# Patient Record
Sex: FEMALE | Race: WHITE | Employment: UNEMPLOYED | URBAN - METROPOLITAN AREA
[De-identification: names, ages, dates, MRNs, and addresses within clinical notes are randomized per-mention and may not be internally consistent; named-entity substitution may affect disease eponyms.]

---

## 2017-01-06 ENCOUNTER — HOSPITAL ENCOUNTER (INPATIENT)
Facility: HOSPITAL | Age: 67
LOS: 3 days | Discharge: HOME | DRG: 392 | End: 2017-01-09
Attending: EMERGENCY MEDICINE | Admitting: HOSPITALIST
Payer: COMMERCIAL

## 2017-01-06 ENCOUNTER — APPOINTMENT (OUTPATIENT)
Dept: CT IMAGING | Facility: HOSPITAL | Age: 67
DRG: 392 | End: 2017-01-06
Attending: EMERGENCY MEDICINE
Payer: COMMERCIAL

## 2017-01-06 DIAGNOSIS — K57.92 ACUTE DIVERTICULITIS: Primary | ICD-10-CM

## 2017-01-06 LAB
ALBUMIN SERPL-MCNC: 3.7 G/DL (ref 3.5–4.8)
ALP LIVER SERPL-CCNC: 65 U/L (ref 50–130)
ALT SERPL-CCNC: 13 U/L (ref 14–54)
AST SERPL-CCNC: 14 U/L (ref 15–41)
BASOPHILS # BLD AUTO: 0.04 X10(3) UL (ref 0–0.1)
BASOPHILS NFR BLD AUTO: 0.3 %
BILIRUB SERPL-MCNC: 1.1 MG/DL (ref 0.1–2)
BILIRUB UR QL STRIP.AUTO: NEGATIVE
BUN BLD-MCNC: 11 MG/DL (ref 8–20)
CALCIUM BLD-MCNC: 8.7 MG/DL (ref 8.3–10.3)
CHLORIDE: 104 MMOL/L (ref 101–111)
CLARITY UR REFRACT.AUTO: CLEAR
CO2: 24 MMOL/L (ref 22–32)
COLOR UR AUTO: YELLOW
CREAT BLD-MCNC: 0.78 MG/DL (ref 0.55–1.02)
EOSINOPHIL # BLD AUTO: 0.06 X10(3) UL (ref 0–0.3)
EOSINOPHIL NFR BLD AUTO: 0.5 %
ERYTHROCYTE [DISTWIDTH] IN BLOOD BY AUTOMATED COUNT: 12.3 % (ref 11.5–16)
GLUCOSE BLD-MCNC: 91 MG/DL (ref 70–99)
GLUCOSE UR STRIP.AUTO-MCNC: NEGATIVE MG/DL
HCT VFR BLD AUTO: 40.1 % (ref 34–50)
HGB BLD-MCNC: 13.6 G/DL (ref 12–16)
IMMATURE GRANULOCYTE COUNT: 0.03 X10(3) UL (ref 0–1)
IMMATURE GRANULOCYTE RATIO %: 0.2 %
KETONES UR STRIP.AUTO-MCNC: NEGATIVE MG/DL
LIPASE: 98 U/L (ref 73–393)
LYMPHOCYTES # BLD AUTO: 1.63 X10(3) UL (ref 0.9–4)
LYMPHOCYTES NFR BLD AUTO: 12.8 %
M PROTEIN MFR SERPL ELPH: 7.7 G/DL (ref 6.1–8.3)
MCH RBC QN AUTO: 33.4 PG (ref 27–33.2)
MCHC RBC AUTO-ENTMCNC: 33.9 G/DL (ref 31–37)
MCV RBC AUTO: 98.5 FL (ref 81–100)
MONOCYTES # BLD AUTO: 0.76 X10(3) UL (ref 0.1–0.6)
MONOCYTES NFR BLD AUTO: 6 %
NEUTROPHIL ABS PRELIM: 10.25 X10 (3) UL (ref 1.3–6.7)
NEUTROPHILS # BLD AUTO: 10.25 X10(3) UL (ref 1.3–6.7)
NEUTROPHILS NFR BLD AUTO: 80.2 %
NITRITE UR QL STRIP.AUTO: NEGATIVE
PH UR STRIP.AUTO: 6 [PH] (ref 4.5–8)
PLATELET # BLD AUTO: 291 10(3)UL (ref 150–450)
POTASSIUM SERPL-SCNC: 3.9 MMOL/L (ref 3.6–5.1)
RBC # BLD AUTO: 4.07 X10(6)UL (ref 3.8–5.1)
RBC UR QL AUTO: NEGATIVE
RED CELL DISTRIBUTION WIDTH-SD: 44.6 FL (ref 35.1–46.3)
SODIUM SERPL-SCNC: 137 MMOL/L (ref 136–144)
SP GR UR STRIP.AUTO: 1.02 (ref 1–1.03)
UROBILINOGEN UR STRIP.AUTO-MCNC: 0.2 MG/DL
WBC # BLD AUTO: 12.8 X10(3) UL (ref 4–13)

## 2017-01-06 PROCEDURE — 74177 CT ABD & PELVIS W/CONTRAST: CPT

## 2017-01-06 PROCEDURE — 99223 1ST HOSP IP/OBS HIGH 75: CPT | Performed by: HOSPITALIST

## 2017-01-06 RX ORDER — METRONIDAZOLE 500 MG/100ML
500 INJECTION, SOLUTION INTRAVENOUS EVERY 8 HOURS
Status: DISCONTINUED | OUTPATIENT
Start: 2017-01-06 | End: 2017-01-09

## 2017-01-06 RX ORDER — SODIUM CHLORIDE 9 MG/ML
INJECTION, SOLUTION INTRAVENOUS CONTINUOUS
Status: ACTIVE | OUTPATIENT
Start: 2017-01-06 | End: 2017-01-06

## 2017-01-06 RX ORDER — LORAZEPAM 2 MG/ML
0.5 INJECTION INTRAMUSCULAR NIGHTLY PRN
Status: DISCONTINUED | OUTPATIENT
Start: 2017-01-06 | End: 2017-01-09

## 2017-01-06 RX ORDER — ONDANSETRON 2 MG/ML
4 INJECTION INTRAMUSCULAR; INTRAVENOUS EVERY 4 HOURS PRN
Status: DISCONTINUED | OUTPATIENT
Start: 2017-01-06 | End: 2017-01-06

## 2017-01-06 RX ORDER — HYDROMORPHONE HYDROCHLORIDE 1 MG/ML
0.8 INJECTION, SOLUTION INTRAMUSCULAR; INTRAVENOUS; SUBCUTANEOUS EVERY 2 HOUR PRN
Status: DISCONTINUED | OUTPATIENT
Start: 2017-01-06 | End: 2017-01-09

## 2017-01-06 RX ORDER — LEVOFLOXACIN 5 MG/ML
750 INJECTION, SOLUTION INTRAVENOUS EVERY 24 HOURS
Status: DISCONTINUED | OUTPATIENT
Start: 2017-01-06 | End: 2017-01-09

## 2017-01-06 RX ORDER — ESTRADIOL 10 UG/1
10 INSERT VAGINAL
COMMUNITY
End: 2018-05-21

## 2017-01-06 RX ORDER — SODIUM CHLORIDE 9 MG/ML
1000 INJECTION, SOLUTION INTRAVENOUS ONCE
Status: COMPLETED | OUTPATIENT
Start: 2017-01-06 | End: 2017-01-06

## 2017-01-06 RX ORDER — HYDROMORPHONE HYDROCHLORIDE 1 MG/ML
0.2 INJECTION, SOLUTION INTRAMUSCULAR; INTRAVENOUS; SUBCUTANEOUS EVERY 2 HOUR PRN
Status: DISCONTINUED | OUTPATIENT
Start: 2017-01-06 | End: 2017-01-09

## 2017-01-06 RX ORDER — NORETHINDRONE ACETATE AND ETHINYL ESTRADIOL 1; 5 MG/1; UG/1
1 TABLET ORAL DAILY
COMMUNITY
End: 2017-12-03

## 2017-01-06 RX ORDER — HYDROMORPHONE HYDROCHLORIDE 1 MG/ML
0.4 INJECTION, SOLUTION INTRAMUSCULAR; INTRAVENOUS; SUBCUTANEOUS EVERY 2 HOUR PRN
Status: DISCONTINUED | OUTPATIENT
Start: 2017-01-06 | End: 2017-01-09

## 2017-01-06 RX ORDER — HEPARIN SODIUM 5000 [USP'U]/ML
5000 INJECTION, SOLUTION INTRAVENOUS; SUBCUTANEOUS EVERY 8 HOURS
Status: DISCONTINUED | OUTPATIENT
Start: 2017-01-06 | End: 2017-01-09

## 2017-01-06 RX ORDER — HYDROMORPHONE HYDROCHLORIDE 1 MG/ML
1 INJECTION, SOLUTION INTRAMUSCULAR; INTRAVENOUS; SUBCUTANEOUS ONCE
Status: COMPLETED | OUTPATIENT
Start: 2017-01-06 | End: 2017-01-06

## 2017-01-06 RX ORDER — ONDANSETRON 2 MG/ML
4 INJECTION INTRAMUSCULAR; INTRAVENOUS ONCE
Status: COMPLETED | OUTPATIENT
Start: 2017-01-06 | End: 2017-01-06

## 2017-01-06 RX ORDER — IBUPROFEN 200 MG
200 TABLET ORAL EVERY 6 HOURS PRN
COMMUNITY
End: 2019-02-16 | Stop reason: ALTCHOICE

## 2017-01-06 RX ORDER — METRONIDAZOLE 500 MG/100ML
500 INJECTION, SOLUTION INTRAVENOUS ONCE
Status: COMPLETED | OUTPATIENT
Start: 2017-01-06 | End: 2017-01-06

## 2017-01-06 RX ORDER — METOCLOPRAMIDE HYDROCHLORIDE 5 MG/ML
10 INJECTION INTRAMUSCULAR; INTRAVENOUS EVERY 6 HOURS PRN
Status: DISCONTINUED | OUTPATIENT
Start: 2017-01-06 | End: 2017-01-09

## 2017-01-06 RX ORDER — SODIUM CHLORIDE 9 MG/ML
INJECTION, SOLUTION INTRAVENOUS CONTINUOUS
Status: DISCONTINUED | OUTPATIENT
Start: 2017-01-06 | End: 2017-01-09

## 2017-01-06 RX ORDER — LEVOFLOXACIN 5 MG/ML
750 INJECTION, SOLUTION INTRAVENOUS ONCE
Status: DISCONTINUED | OUTPATIENT
Start: 2017-01-06 | End: 2017-01-06

## 2017-01-06 RX ORDER — ONDANSETRON 2 MG/ML
4 INJECTION INTRAMUSCULAR; INTRAVENOUS EVERY 6 HOURS PRN
Status: DISCONTINUED | OUTPATIENT
Start: 2017-01-06 | End: 2017-01-09

## 2017-01-06 RX ORDER — BIOTIN 1 MG
1 TABLET ORAL DAILY
COMMUNITY
End: 2017-12-04

## 2017-01-06 RX ORDER — HYDROMORPHONE HYDROCHLORIDE 1 MG/ML
0.5 INJECTION, SOLUTION INTRAMUSCULAR; INTRAVENOUS; SUBCUTANEOUS EVERY 30 MIN PRN
Status: DISPENSED | OUTPATIENT
Start: 2017-01-06 | End: 2017-01-06

## 2017-01-06 RX ORDER — LORAZEPAM 2 MG/ML
0.5 INJECTION INTRAMUSCULAR EVERY 6 HOURS PRN
Status: DISCONTINUED | OUTPATIENT
Start: 2017-01-06 | End: 2017-01-06

## 2017-01-06 NOTE — CONSULTS
[facility]  Report of Consultation    Geri Hunter Patient Status:  Emergency    1950 MRN [id number]   Location [facility] Attending Jeremiah Doe MD   Hosp Day # 0 PCP Maverick Keyes     Reason for Consultation:  Johnny Jose significant history of colon cancer in her mother ( at age 80) and maternal grandmother (age unknown). Both her father and daughter have had diverticulitis. The patient does not take any blood thinners except for occasional NSAIDs for pain.   Sh Aj    MRI, LUMBAR SPINE  2015     Family History   Problem Relation Age of Onset   • Lung Disorder Father         • Heart Disease Father    • Other[Other] [OTHER] Mother      emph   • High Blood Pressure Mother         • High Ch Negative for gait disturbance  Psychiatric:  Negative for inappropriate interaction and psychiatric symptoms  Respiratory:  Negative for cough, dyspnea and wheezing    Physical Exam:  Blood pressure 136/75, pulse 92, temperature 98 °F (36.7 °C), temperatur    transmitted to the [facility] ([facility]) [address]) which includes the Dose Index Registry.      PATIENT STATED HISTORY:  Patient states pressure in pelvic and anal area, worse with standing /walking last 3 day visible mass.  Pelvic organs appropriate for patient age.     BONES:  Mild degenerative changes at L2-3. .  No bony lesion or fracture.     LUNG BASES:  Normal.  No visible pulmonary or pleural disease.         =====  CONCLUSION:         1.  Significant mild Oswald  1/6/2017  4:24 PM    Addendum:  Dr. Nicole Urrutia      I have reviewed the above listed note and evaluation by the physician assistant. I have personally examined the patient and reviewed all relevant labs and reports.  I agree with her physical exam asked her to follow-up with Dr. Kaden Stuart. He may elect to perform an endoscopic EGD with ultrasound. I am concerned about the head of the pancreas in this significant dilatation of the biliary tree and gallbladder. She may benefit from a papillotomy.

## 2017-01-06 NOTE — ED PROVIDER NOTES
Patient Seen in: [facility] Emergency Department    History   Patient presents with:  Abdomen/Flank Pain (GI/)    Stated Complaint: lower abdominal pain    HPI    24-year-old female complaining of abdominal pain the patient's and lower abdominal p SPI DX/THER NJX N/A 10/26/2015    Comment Procedure: LUMBAR / TRANSFORAMINAL EPIDURAL STEROID INJECTION;  Surgeon: Britta Dang DO;  Location: [address] [place] [address]    MRI, LUMBAR SPINE  06/2015       Medications :   Cholecalciferol (VITAMIN D3) 10 95   Resp 01/06/17 1145 17   Temp 01/06/17 1145 98 °F (36.7 °C)   Temp src 01/06/17 1145 Temporal   SpO2 01/06/17 1145 99 %   O2 Device 01/06/17 1200 None (Room air)       Current:/75 mmHg  Pulse 92  Temp(Src) 98 °F (36.7 °C) (Temporal)  Resp 18  Ht Abnormal            Final result                 Please view results for these tests on the individual orders.    RAINBOW DRAW BLUE   RAINBOW DRAW GOLD   RAINBOW DRAW LAVENDER   RAINBOW DRAW LIGHT GREEN   URINE CULTURE, ROUTINE       MDM   CT scan shows d

## 2017-01-06 NOTE — H&P
[facility]ITAL[facility]  History and Physical     Rubi Espitia Patient Status:  Emergency    1950 MRN [id number]   Location 6[facility] Attending Nora Trujillo MD   Hosp Day # 0 PCP Paula Kathleen     Chief Complaint: Shelby Mirela DX/THERAPEUTIC SUBSTANCE, EPIDURAL/SUBARACHNOID; LUMBAR/SACRAL N/A 10/26/2015    Comment Procedure: LUMBAR / TRANSFORAMINAL EPIDURAL STEROID INJECTION;  Surgeon: Debbie Russell DO;  Location: [address] [place] [address] ND/CATH [facility] D oriented x 3. HEENT: Normocephalic atraumatic. Moist mucous membranes. Respiratory: Clear to auscultation bilaterally. Cardiovascular: S1, S2. Regular rate and rhythm. Equal pulses. Abdomen: Soft, tender RLQ and LLQ, nondistended.   Positive bowel s

## 2017-01-06 NOTE — PROGRESS NOTES
[facility] Pharmacy Note:  Renal Adjustment for Levaquin (levofloxacin)    Cristopher Ramon is a 61year old female who has been prescribed Levaquin (levofloxacin) 500 mg X 1. CrCl is estimated creatinine clearance is 57.6 mL/min (based on Cr of 0.78).  so the dose

## 2017-01-07 LAB
ALBUMIN SERPL-MCNC: 2.6 G/DL (ref 3.5–4.8)
ALP LIVER SERPL-CCNC: 51 U/L (ref 50–130)
ALT SERPL-CCNC: 10 U/L (ref 14–54)
AST SERPL-CCNC: 9 U/L (ref 15–41)
BASOPHILS # BLD AUTO: 0.03 X10(3) UL (ref 0–0.1)
BASOPHILS NFR BLD AUTO: 0.2 %
BILIRUB SERPL-MCNC: 0.8 MG/DL (ref 0.1–2)
BUN BLD-MCNC: 6 MG/DL (ref 8–20)
CALCIUM BLD-MCNC: 7.8 MG/DL (ref 8.3–10.3)
CHLORIDE: 108 MMOL/L (ref 101–111)
CO2: 21 MMOL/L (ref 22–32)
CREAT BLD-MCNC: 0.54 MG/DL (ref 0.55–1.02)
EOSINOPHIL # BLD AUTO: 0.02 X10(3) UL (ref 0–0.3)
EOSINOPHIL NFR BLD AUTO: 0.2 %
ERYTHROCYTE [DISTWIDTH] IN BLOOD BY AUTOMATED COUNT: 12.4 % (ref 11.5–16)
GLUCOSE BLD-MCNC: 86 MG/DL (ref 70–99)
HAV IGM SER QL: 1.9 MG/DL (ref 1.7–3)
HCT VFR BLD AUTO: 33 % (ref 34–50)
HGB BLD-MCNC: 10.8 G/DL (ref 12–16)
IMMATURE GRANULOCYTE COUNT: 0.07 X10(3) UL (ref 0–1)
IMMATURE GRANULOCYTE RATIO %: 0.5 %
LYMPHOCYTES # BLD AUTO: 1.42 X10(3) UL (ref 0.9–4)
LYMPHOCYTES NFR BLD AUTO: 11 %
M PROTEIN MFR SERPL ELPH: 6.1 G/DL (ref 6.1–8.3)
MCH RBC QN AUTO: 32.8 PG (ref 27–33.2)
MCHC RBC AUTO-ENTMCNC: 32.7 G/DL (ref 31–37)
MCV RBC AUTO: 100.3 FL (ref 81–100)
MONOCYTES # BLD AUTO: 0.72 X10(3) UL (ref 0.1–0.6)
MONOCYTES NFR BLD AUTO: 5.6 %
NEUTROPHIL ABS PRELIM: 10.63 X10 (3) UL (ref 1.3–6.7)
NEUTROPHILS # BLD AUTO: 10.63 X10(3) UL (ref 1.3–6.7)
NEUTROPHILS NFR BLD AUTO: 82.5 %
PLATELET # BLD AUTO: 229 10(3)UL (ref 150–450)
POTASSIUM SERPL-SCNC: 3.7 MMOL/L (ref 3.6–5.1)
RBC # BLD AUTO: 3.29 X10(6)UL (ref 3.8–5.1)
RED CELL DISTRIBUTION WIDTH-SD: 45.6 FL (ref 35.1–46.3)
SODIUM SERPL-SCNC: 140 MMOL/L (ref 136–144)
WBC # BLD AUTO: 12.9 X10(3) UL (ref 4–13)

## 2017-01-07 PROCEDURE — 99232 SBSQ HOSP IP/OBS MODERATE 35: CPT | Performed by: HOSPITALIST

## 2017-01-07 RX ORDER — POTASSIUM CHLORIDE 20 MEQ/1
40 TABLET, EXTENDED RELEASE ORAL ONCE
Status: COMPLETED | OUTPATIENT
Start: 2017-01-07 | End: 2017-01-07

## 2017-01-07 NOTE — PAYOR COMM NOTE
Attending Physician: Manny Brice MD    Review Type: ADMISSION   Reviewer: Corin Ma       Date: January 7, 2017 - 2:37 PM  Payor: [address] [place] [address]  Authorization Number: [id number]  Admit date: 1/6/2017 11:34 AM   Admitted from Emergency Dept.: yes User    1/6/2017 1737 Given 0.5 mg Intravenous Mary Griffin RN      HYDROmorphone HCl PF (DILAUDID) 1 MG/ML injection 0.4 mg     Date Action Dose Route User    1/7/2017 0752 Given 0.4 mg Intravenous My Cheng RN    1/7/2017 6178 Given 0.4 mg In (none) Intravenous Megan Tse, KATE          RESULTS LAST 24HRS:  Labs Reviewed   COMP METABOLIC PANEL (14) - Abnormal; Notable for the following:     AST 14 (*)     Alt 13 (*)     All other components within normal limits   URINALYSIS WITH CULTURE REF following orders were created for panel order CBC WITH DIFFERENTIAL WITH PLATELET.   Procedure                               Abnormality         Status                     ---------                               -----------         ------ with diverticulitis.  Mild dilation of the biliary tree may be related to dilaudid use - liver function tests are negative and pattern of abdominal pain not typical for biliary colic.              Last colonoscopy 3/2012 for family hx of colon cancer was n

## 2017-01-07 NOTE — CONSULTS
Nettie Baron MD    Department of Gastroenterology  [facility] Patient Status:  Inpatient    1950 MRN [id number]   [facility] 3SW-A Attending Anjel Christine MD   [facility] Day # 0 PC Tone Islas, ;  Location: [address] [place] [address]    INJECTION, W/WO CONTRAST, DX/THERAPEUTIC SUBSTANCE, EPIDURAL/SUBARACHNOID; LUMBAR/SACRAL N/A 10/26/2015    Comment Procedure: LUMBAR / TRANSFORAMINAL EPIDURAL STEROID INJECTION;  Surgeon: Jim Evans, Intravenous, Q6H PRN  •  LevoFLOXacin in D5W (LEVAQUIN) IVPB premix 750 mg, 750 mg, Intravenous, Q24H  •  metRONIDAZOLE in NaCl (FLAGYL) 5 mg/ml IVPB premix 500 mg, 500 mg, Intravenous, Q8H  •  Metoclopramide HCl (REGLAN) injection 10 mg, 10 mg, Intravenou lower abdominal tenderness. No masses. Bowel sounds are present. Back: No CVA tenderness. Extremities: No edema, cyanosis, or clubbing. Skin: Warm and dry.   Rectal: deferred  Laboratory Data:    Lab Results  Component Value Date   WBC 12.8 01/06/2017 normal measuring 2 mm. SPLEEN:  Normal.  No enlargement or focal lesion.     KIDNEYS:  Small lesions within the kidneys which are too small to characterize which likely represent cysts measure less than 1 cm. .  No mass, obstruction, or calcification.    [id number]           [address] [place] [address] RECORD #:     [id number]             DATE OF BIRTH:   09/26/1950  ADMISSION DATE:       03/23/2012           OPERATION DATE:  03/23/2012                                 OPERATIVE REPORT          PREOPERATIV you for allowing to participate in the care of this patient.     Sonu Jean Baptiste  1/6/2017  7:50 PM

## 2017-01-07 NOTE — PROGRESS NOTES
[facility]  Progress Note    Bibi Briones Patient Status:  Inpatient    1950 MRN [id number]   [facility] 3SW-A Attending Judah Foy MD   Hosp Day # 1 PCP MILENA PITT     Subjective:  Continued abdominal pain and nausea.   No LLC    M-SEDAJ BY  PHYS [id number] .S. [facility] 59  N SVC 5+ YR N/A 8/3/2015    Comment Procedure: FACET INJECTION UNDER FLUOROSCOPY;  Surgeon: Jesus Hopper DO;  Location: [address] [place] [address]    INJ PARAVERT F JNT L/S 1 LEV Left 9/14/2015    Comment Procedure: [facility] 01/07/2017   TP 6.1 01/07/2017   AST 9 01/07/2017   ALT 10 01/07/2017   MG 1.9 01/07/2017     Assessment:      Abdominal pain  Sigmoid diverticulitis  Abnormal ct of the abdomen              No significant improved after about 24 hours of antibiotics.   No

## 2017-01-07 NOTE — PROGRESS NOTES
[facility]  Progress Note     Kristenshelly Avendano Patient Status:  Inpatient    1950 MRN [id number]   [facility] 3SW-A Attending Kathy Bush MD   Hosp Day # 1 PCP Meme Aranda     Chief Complaint: Diverticulitis    S: Patient admi c-scope in 6-8 weeks    UTI  -Abx as above  -Follow-up UC    Diarrhea, prior to admission, recent abx use  -Check CDiff  -Isolation    Pancreatic divisum  -Outpatient follow-up    Anemia, likely dilutional  -Monitor    Metabolic acidosis  -Monitor    Quali

## 2017-01-07 NOTE — PROGRESS NOTES
[facility]  Progress Note    Blanquitagilbertomarileerick Trujillo Patient Status:  Inpatient    1950 MRN [id number]   [facility] 3SW-A Attending Sasha Merino MD   Hosp Day # 1 PCP MILENA PITT     Subjective:  No new complaints, suprapubic pain.  Recta L4-5     Right-sided low back pain with right-sided sciatica     Acute diverticulitis     Acute cystitis without hematuria    Patient feels pain meds contributing to nausea and small amounts of emesis  Still with pain in the lower abdomen same as at [employer]

## 2017-01-08 LAB
BASOPHILS # BLD AUTO: 0.02 X10(3) UL (ref 0–0.1)
BASOPHILS NFR BLD AUTO: 0.2 %
BUN BLD-MCNC: 3 MG/DL (ref 8–20)
CALCIUM BLD-MCNC: 8.5 MG/DL (ref 8.3–10.3)
CHLORIDE: 109 MMOL/L (ref 101–111)
CO2: 17 MMOL/L (ref 22–32)
CREAT BLD-MCNC: 0.4 MG/DL (ref 0.55–1.02)
EOSINOPHIL # BLD AUTO: 0.03 X10(3) UL (ref 0–0.3)
EOSINOPHIL NFR BLD AUTO: 0.2 %
ERYTHROCYTE [DISTWIDTH] IN BLOOD BY AUTOMATED COUNT: 12 % (ref 11.5–16)
GLUCOSE BLD-MCNC: 81 MG/DL (ref 70–99)
HAV IGM SER QL: 1.9 MG/DL (ref 1.7–3)
HCT VFR BLD AUTO: 33.8 % (ref 34–50)
HGB BLD-MCNC: 10.8 G/DL (ref 12–16)
IMMATURE GRANULOCYTE COUNT: 0.09 X10(3) UL (ref 0–1)
IMMATURE GRANULOCYTE RATIO %: 0.7 %
LYMPHOCYTES # BLD AUTO: 0.96 X10(3) UL (ref 0.9–4)
LYMPHOCYTES NFR BLD AUTO: 7.5 %
MCH RBC QN AUTO: 33.1 PG (ref 27–33.2)
MCHC RBC AUTO-ENTMCNC: 32 G/DL (ref 31–37)
MCV RBC AUTO: 103.7 FL (ref 81–100)
MONOCYTES # BLD AUTO: 0.69 X10(3) UL (ref 0.1–0.6)
MONOCYTES NFR BLD AUTO: 5.4 %
NEUTROPHIL ABS PRELIM: 10.98 X10 (3) UL (ref 1.3–6.7)
NEUTROPHILS # BLD AUTO: 10.98 X10(3) UL (ref 1.3–6.7)
NEUTROPHILS NFR BLD AUTO: 86 %
PLATELET # BLD AUTO: 228 10(3)UL (ref 150–450)
POTASSIUM SERPL-SCNC: 4.1 MMOL/L (ref 3.6–5.1)
RBC # BLD AUTO: 3.26 X10(6)UL (ref 3.8–5.1)
RED CELL DISTRIBUTION WIDTH-SD: 46.1 FL (ref 35.1–46.3)
SODIUM SERPL-SCNC: 138 MMOL/L (ref 136–144)
WBC # BLD AUTO: 12.8 X10(3) UL (ref 4–13)

## 2017-01-08 PROCEDURE — 99232 SBSQ HOSP IP/OBS MODERATE 35: CPT | Performed by: HOSPITALIST

## 2017-01-08 RX ORDER — ACETAMINOPHEN 325 MG/1
650 TABLET ORAL EVERY 6 HOURS PRN
Status: DISCONTINUED | OUTPATIENT
Start: 2017-01-08 | End: 2017-01-09

## 2017-01-08 RX ORDER — NORETHINDRONE ACETATE AND ETHINYL ESTRADIOL 1; 5 MG/1; UG/1
1 TABLET ORAL DAILY
Status: DISCONTINUED | OUTPATIENT
Start: 2017-01-08 | End: 2017-01-09

## 2017-01-08 RX ORDER — TRAMADOL HYDROCHLORIDE 50 MG/1
50 TABLET ORAL EVERY 6 HOURS PRN
Status: DISCONTINUED | OUTPATIENT
Start: 2017-01-08 | End: 2017-01-09

## 2017-01-08 RX ORDER — IBUPROFEN 400 MG/1
400 TABLET ORAL EVERY 6 HOURS PRN
Status: DISCONTINUED | OUTPATIENT
Start: 2017-01-08 | End: 2017-01-08

## 2017-01-08 RX ORDER — ESTRADIOL 10 UG/1
10 INSERT VAGINAL
Status: DISCONTINUED | OUTPATIENT
Start: 2017-01-09 | End: 2017-01-09

## 2017-01-08 NOTE — PLAN OF CARE
Dr. Ana Flowers on floor, physician asked for order to be entered to discontinue isolation precaution. Pt not having diarrhea and has no history of C-Diff infection. Order discontinued.

## 2017-01-08 NOTE — PROGRESS NOTES
[facility]  Progress Note    Garciahaseeb Panda Patient Status:  Inpatient    1950 MRN [id number]   [facility] 3SW-A Attending Lola Scott MD   Hosp Day # 2 PCP MILENA PITT     Subjective:  No new complaints, minimal pelvic and low half of our visit was spent in counseling the patient on the above listed diagnoses and treatment options.     Lizeth Owen  1/8/2017  1:09 PM

## 2017-01-08 NOTE — PLAN OF CARE
Pt states pain is slightly improved from 1/7, states low anterior abd pain is better, current pain mostly very low/rectal pain. Still has nausea, does not wish to take Dilaudid, feels it makes nausea worse.   New order for Calos, did not administer, notif

## 2017-01-08 NOTE — PROGRESS NOTES
[facility]  Progress Note    Merline Bingham Patient Status:  Inpatient    1950 MRN [id number]   [facility] 3SW-A Attending Breezy Enriquez MD   Hosp Day # 2 PCP MILENA PITT     Subjective:  Markedly improved this afternoon.   NO fur 2008    Comment cyst -Ovarian    MUSC/TENDON REPAIR EACH; ARM/ELBOW  02/10/2012    Comment R wrist    D & C  2012    Comment and Hysteroscopy    COLONOSCOPY  03/2012    Comment wnl     INJ PARAVERT F JNT L/S 1 LEV N/A 8/3/2015    Comment Procedure: FACET I edema      Labs:     Lab Results  Component Value Date   WBC 12.8 01/08/2017   HGB 10.8 01/08/2017   HCT 33.8 01/08/2017   .0 01/08/2017   CREATSERUM 0.40 01/08/2017   BUN 3 01/08/2017    01/08/2017   K 4.1 01/08/2017    01/08/2017   CO2

## 2017-01-08 NOTE — PROGRESS NOTES
[facility]  Progress Note     Ana Luisajoesph AlvarezJordan Patient Status:  Inpatient    1950 MRN [id number]   [facility] 3SW-A Attending Roya Zeng MD   Hosp Day # 2 PCP Moraima Ryan     Chief Complaint: Diverticulitis    S: Patient stat Abdominal pain d/t acute sigmoid diverticulitis  -NPO  -IVF   -Levaquin/Flagyl D3  -Analgesics and antiemetics as needed - Toradol if okay with GI  -GI & Surgery on consult  -Will need c-scope in 6-8 weeks    Abnormal UA, UC negative    Diarrhea, prior

## 2017-01-09 VITALS
HEIGHT: 62 IN | SYSTOLIC BLOOD PRESSURE: 117 MMHG | OXYGEN SATURATION: 100 % | TEMPERATURE: 98 F | RESPIRATION RATE: 18 BRPM | HEART RATE: 92 BPM | BODY MASS INDEX: 20.98 KG/M2 | DIASTOLIC BLOOD PRESSURE: 57 MMHG | WEIGHT: 114 LBS

## 2017-01-09 PROCEDURE — 99239 HOSP IP/OBS DSCHRG MGMT >30: CPT | Performed by: HOSPITALIST

## 2017-01-09 RX ORDER — METRONIDAZOLE 250 MG/1
250 TABLET ORAL 3 TIMES DAILY
Qty: 30 TABLET | Refills: 0 | Status: SHIPPED | OUTPATIENT
Start: 2017-01-09 | End: 2017-07-10

## 2017-01-09 RX ORDER — LEVOFLOXACIN 500 MG/1
500 TABLET, FILM COATED ORAL DAILY
Qty: 10 TABLET | Refills: 0 | Status: SHIPPED | OUTPATIENT
Start: 2017-01-09 | End: 2017-07-10

## 2017-01-09 NOTE — PROGRESS NOTES
[facility]  Progress Note    Brian Letters Patient Status:  Inpatient    1950 MRN [id number]   [facility] 3SW-A Attending Alonzo Felipe MD   Hosp Day # 3 PCP MILENA PITT     Subjective:  No new complaints, no pain, tolerating a

## 2017-01-09 NOTE — DISCHARGE SUMMARY
[facility] HOSPITALIST  DISCHARGE SUMMARY     Merline Bingham Patient Status:  Inpatient    1950 MRN [id number]   [facility] 3SW-A Attending Breezy Enriquez MD   [facility] Day # 3 PCP Rubina Wright     Date of Admission: 2017  Date of Discharge use - no diarrhea since admit    Pancreatic divisum  -Outpatient follow-up    Anemia, likely dilutional  -Monitor    Metabolic acidosis  -Monitor    Discharge Medication List:     Discharge Medications      START taking these medications       Instructions [address] [place] [address]    Call      Rekha Kramer MD  2 TRANS AM [facility], located within [facility]  [address] [facility] [address] [place] [address] [phone]    Schedule an appointment as soon as possible for a visit  in 2-4 weeks    Mamadou Key MD  [address] [facility],Fourth Floor [facility] [address]  Mel Goodell

## 2017-01-09 NOTE — PLAN OF CARE
GASTROINTESTINAL - ADULT    • Minimal or absence of nausea and vomiting Adequate for Discharge        PAIN - ADULT    • Verbalizes/displays adequate comfort level or patient's stated pain goal Adequate for Discharge        Patient/Family Goals    • Patient

## 2017-01-17 ENCOUNTER — OFFICE VISIT (OUTPATIENT)
Dept: SURGERY | Facility: CLINIC | Age: 67
End: 2017-01-17

## 2017-01-17 VITALS
DIASTOLIC BLOOD PRESSURE: 70 MMHG | HEART RATE: 72 BPM | WEIGHT: 114 LBS | SYSTOLIC BLOOD PRESSURE: 128 MMHG | BODY MASS INDEX: 20.98 KG/M2 | TEMPERATURE: 98 F | HEIGHT: 62 IN

## 2017-01-17 DIAGNOSIS — K57.92 ACUTE DIVERTICULITIS: Primary | ICD-10-CM

## 2017-01-17 PROCEDURE — 99213 OFFICE O/P EST LOW 20 MIN: CPT | Performed by: COLON & RECTAL SURGERY

## 2017-01-17 NOTE — PATIENT INSTRUCTIONS
This patient presents following recent hospitalization for acute diverticulitis. The patient was hospitalized from January 6-9. She presented with severe lower abdominal pain which radiated into the rectum.   She did have some associated diarrhea and mild have had diverticulitis. Physical exam:    The patient is awake, alert, in no acute distress. Her lungs are clear to auscultation bilaterally. Her heart rate and rhythm are regular.   Her abdomen is soft, nontender, nondistended, with normoactive bowel

## 2017-01-17 NOTE — H&P
New Patient Visit Note       Active Problems      1. Acute diverticulitis        Chief Complaint   Diverticulitis    History of Present Illness   This patient presents following recent hospitalization for acute diverticulitis.  The patient was hospitalized hemorrhoids. There were no polyps. She does have a family history of colon cancer in her mother and maternal grandmother. Both her father and daughter have had diverticulitis. Allergies  Joselin Dumont is allergic to codeine.     Past Medical / Surgical / So Location: [address] [place] [address]    MRI, LUMBAR SPINE  2015       The family history and social history have been reviewed by me today.     Family History   Problem Relation Age of Onset   • Lung Disorder Father         • Heart Disease Father vaginally twice a week. Monday and Thursday. Disp:  Rfl:    ClonazePAM (KLONOPIN) 0.5 MG Oral Tab Take 1 mg by mouth nightly as needed. Disp:  Rfl:          Review of Systems  The Review of Systems has been reviewed by me during today.   Review of Systems rhonchi. She has no rales. She exhibits no mass. Abdominal: Soft. Normal appearance, normal aorta and bowel sounds are normal. She exhibits no distension, no fluid wave, no ascites, no pulsatile midline mass and no mass.  There is no splenomegaly or hepat oral antibiotics. Since her discharge from the hospital the patient has been feeling significantly improved. She states she has had no abdominal pain. Her appetite has returned to normal and she is gradually introducing regular food into her diet.   Sh will be performed near the end of March with Dr. Miranda Acosta. The patient should make sure that a copy of the colonoscopy report is sent to our office.     At today's office visit I discussed with the patient and her  that the patient should call us with

## 2017-01-27 ENCOUNTER — TELEPHONE (OUTPATIENT)
Dept: SURGERY | Facility: CLINIC | Age: 67
End: 2017-01-27

## 2017-01-31 NOTE — TELEPHONE ENCOUNTER
Pt had increased her fiber intake dramatically and had some pressure in her lower abdomen. This lasted 1 day and is completely resolved now, pt denies pain, nausea, vomiting, fever and states she is having normal BM on a daily basis.

## 2017-02-08 ENCOUNTER — LAB ENCOUNTER (OUTPATIENT)
Dept: LAB | Facility: HOSPITAL | Age: 67
End: 2017-02-08
Attending: INTERNAL MEDICINE
Payer: COMMERCIAL

## 2017-02-08 DIAGNOSIS — K57.92 ACUTE DIVERTICULITIS: ICD-10-CM

## 2017-02-08 LAB
BASOPHILS # BLD AUTO: 0.04 X10(3) UL (ref 0–0.1)
BASOPHILS NFR BLD AUTO: 0.7 %
EOSINOPHIL # BLD AUTO: 0.1 X10(3) UL (ref 0–0.3)
EOSINOPHIL NFR BLD AUTO: 1.8 %
ERYTHROCYTE [DISTWIDTH] IN BLOOD BY AUTOMATED COUNT: 11.9 % (ref 11.5–16)
FOLATE (FOLIC ACID), SERUM: 33.5 NG/ML (ref 8.7–24)
HAV AB SERPL IA-ACNC: 571 PG/ML (ref 193–986)
HCT VFR BLD AUTO: 39 % (ref 34–50)
HGB BLD-MCNC: 12.6 G/DL (ref 12–16)
IMMATURE GRANULOCYTE COUNT: 0.02 X10(3) UL (ref 0–1)
IMMATURE GRANULOCYTE RATIO %: 0.4 %
IRON: 125 UG/DL (ref 28–170)
LYMPHOCYTES # BLD AUTO: 2.4 X10(3) UL (ref 0.9–4)
LYMPHOCYTES NFR BLD AUTO: 42.1 %
MCH RBC QN AUTO: 32.3 PG (ref 27–33.2)
MCHC RBC AUTO-ENTMCNC: 32.3 G/DL (ref 31–37)
MCV RBC AUTO: 100 FL (ref 81–100)
MONOCYTES # BLD AUTO: 0.47 X10(3) UL (ref 0.1–0.6)
MONOCYTES NFR BLD AUTO: 8.2 %
NEUTROPHIL ABS PRELIM: 2.67 X10 (3) UL (ref 1.3–6.7)
NEUTROPHILS # BLD AUTO: 2.67 X10(3) UL (ref 1.3–6.7)
NEUTROPHILS NFR BLD AUTO: 46.8 %
PLATELET # BLD AUTO: 328 10(3)UL (ref 150–450)
RBC # BLD AUTO: 3.9 X10(6)UL (ref 3.8–5.1)
RED CELL DISTRIBUTION WIDTH-SD: 43.5 FL (ref 35.1–46.3)
WBC # BLD AUTO: 5.7 X10(3) UL (ref 4–13)

## 2017-02-08 PROCEDURE — 82746 ASSAY OF FOLIC ACID SERUM: CPT

## 2017-02-08 PROCEDURE — 82607 VITAMIN B-12: CPT

## 2017-02-08 PROCEDURE — 36415 COLL VENOUS BLD VENIPUNCTURE: CPT

## 2017-02-08 PROCEDURE — 85025 COMPLETE CBC W/AUTO DIFF WBC: CPT

## 2017-02-08 PROCEDURE — 83540 ASSAY OF IRON: CPT

## 2017-06-09 ENCOUNTER — TELEPHONE (OUTPATIENT)
Dept: SURGERY | Facility: CLINIC | Age: 67
End: 2017-06-09

## 2017-06-09 RX ORDER — LEVOFLOXACIN 500 MG/1
500 TABLET, FILM COATED ORAL DAILY
Qty: 10 TABLET | Refills: 0 | Status: SHIPPED | OUTPATIENT
Start: 2017-06-09 | End: 2017-06-19

## 2017-06-09 RX ORDER — METRONIDAZOLE 250 MG/1
250 TABLET ORAL 3 TIMES DAILY
Qty: 30 TABLET | Refills: 0 | Status: SHIPPED | OUTPATIENT
Start: 2017-06-09 | End: 2017-06-19

## 2017-06-09 NOTE — TELEPHONE ENCOUNTER
Patient called with condition update. Patient has history of diverticulitis. C/o lower abdominal pain 8/10 that started yesterday. Patient states it feels that same as before. Denies any fever or chills. Notified Edison LEE.  Sent Manolo

## 2017-06-19 ENCOUNTER — OFFICE VISIT (OUTPATIENT)
Dept: SURGERY | Facility: CLINIC | Age: 67
End: 2017-06-19

## 2017-06-19 VITALS
HEIGHT: 62 IN | BODY MASS INDEX: 21.53 KG/M2 | WEIGHT: 117 LBS | SYSTOLIC BLOOD PRESSURE: 106 MMHG | RESPIRATION RATE: 14 BRPM | HEART RATE: 66 BPM | TEMPERATURE: 98 F | DIASTOLIC BLOOD PRESSURE: 69 MMHG

## 2017-06-19 DIAGNOSIS — K57.92 ACUTE DIVERTICULITIS: Primary | ICD-10-CM

## 2017-06-19 PROCEDURE — 99215 OFFICE O/P EST HI 40 MIN: CPT | Performed by: COLON & RECTAL SURGERY

## 2017-06-19 RX ORDER — OMEPRAZOLE 40 MG/1
CAPSULE, DELAYED RELEASE ORAL
Refills: 1 | COMMUNITY
Start: 2017-04-10 | End: 2017-07-10

## 2017-06-19 RX ORDER — CLONAZEPAM 1 MG/1
TABLET ORAL
Refills: 1 | COMMUNITY
Start: 2017-05-01 | End: 2017-07-10

## 2017-06-19 NOTE — PATIENT INSTRUCTIONS
I am seeing this patient in consultation regarding acute diverticulitis. This patient was initially evaluated in January of this year. She was in the hospital for diverticulitis. We saw her as a follow-Iup later in January.   We told her to call us wit masses. The patient's BMI is 21.39. Liver is normal, spleen is not palpable. Bowel sounds have normal activity and normal pitch. There are no inguinal or umbilical hernias present. There is no evidence of ascites.     I personally reviewed the CT scan toward it after one hospitalization and then 1 major outpatient attack. I would leave it up to them. If she has a third major attack, I would then for sure recommend elective surgical intervention. Brisa Eagle

## 2017-06-19 NOTE — PROGRESS NOTES
Follow Up Visit Note       Active Problems      1. Acute diverticulitis          Chief Complaint   Patient presents with:  Diverticulitis: Est Pt. further care and treatment.  follow up diverticulitis        History of Present Illness  I am seeing this kimmy is relying solely on the generic Benefiber. I personally reviewed the CT scan obtained in January of this year at [facility].  I have provided my own interpretation.   It does show very long segment of diverticulitis within a long segment of the sig likely represent cysts measure less than 1 cm. .  No mass, obstruction, or calcification. ADRENALS:  Normal.  No mass or enlargement. AORTA/VASCULAR:  There is probable reflux within the left gonadal vein with varices in the pelvis.   No aneurysm or 5/29/07     chest   • History of basal cell cancer 10/29/09     nose   • Pneumonia, organism unspecified(486)    • Back problem      c4-6 fusion, LESI   • UTI (urinary tract infection)          Past Surgical History    TONSILLECTOMY      OTHER SURGICAL HIS colon   • Other[Other] [OTHER] Maternal Grandfather      emphzema   • Diabetes Paternal Grandmother    • Other[Other] [OTHER] Paternal Grandfather      brain tumor   • Cancer Paternal Grandfather    • High Blood Pressure Brother      Social History    [employer]  The Review of Systems has been reviewed by me during today. Review of Systems   Constitutional: Negative for fever, chills, diaphoresis, fatigue and unexpected weight change.    HENT: Negative for hearing loss, nosebleeds, sore throat and trouble no fluid wave, no ascites, no pulsatile midline mass and no mass. There is no splenomegaly or hepatomegaly. There is no tenderness.  There is no rigidity, no rebound, no guarding, no CVA tenderness, no tenderness at McBurney's point and negative Knapp's si She is taking her last doses today. She states that her pain was across the belt line. It was very severe for 2 days. It was 7/10. It was constant without relief. It was not as severe as her attack in January. She got better within 20-48 hours.   Sh straight across the abdomen at the level just above the pubic symphysis. There was no free air or free fluid. This patient has now had a second moderate attack of diverticulitis.   She has had several very small attacks of diverticulosis and diverticuli

## 2017-06-28 ENCOUNTER — TELEPHONE (OUTPATIENT)
Dept: SURGERY | Facility: CLINIC | Age: 67
End: 2017-06-28

## 2017-06-28 NOTE — TELEPHONE ENCOUNTER
Pt scheduled for colon resection for diverticulitis on 7/26 and wanted to move this surgery up. Spoke with Dr. Tish Sunshine who reviewed chart and will not move surgery up since pt had recent bout of diverticulitis.  Informed pt that moving this would increase ri

## 2017-07-06 ENCOUNTER — OFFICE VISIT (OUTPATIENT)
Dept: SURGERY | Facility: CLINIC | Age: 67
End: 2017-07-06

## 2017-07-06 VITALS
SYSTOLIC BLOOD PRESSURE: 126 MMHG | HEART RATE: 66 BPM | DIASTOLIC BLOOD PRESSURE: 76 MMHG | WEIGHT: 117 LBS | RESPIRATION RATE: 16 BRPM | BODY MASS INDEX: 21.53 KG/M2 | HEIGHT: 62 IN

## 2017-07-06 DIAGNOSIS — Z80.0 FAMILY HISTORY OF COLON CANCER: ICD-10-CM

## 2017-07-06 DIAGNOSIS — K57.92 ACUTE DIVERTICULITIS: ICD-10-CM

## 2017-07-06 DIAGNOSIS — K57.92 DIVERTICULITIS OF INTESTINE WITHOUT PERFORATION OR ABSCESS WITHOUT BLEEDING, UNSPECIFIED PART OF INTESTINAL TRACT: Primary | ICD-10-CM

## 2017-07-06 PROCEDURE — 99215 OFFICE O/P EST HI 40 MIN: CPT | Performed by: COLON & RECTAL SURGERY

## 2017-07-06 NOTE — PROGRESS NOTES
Follow Up Visit Note       Active Problems      1. Diverticulitis of intestine without perforation or abscess without bleeding, unspecified part of intestinal tract    2. Acute diverticulitis    3.  Family history of colon cancer          Chief Complaint generic equivalent of Benefiber on a daily basis. She takes it several times per day. This has given her softer and more regular bowel movements. She states that before this, she did tend toward some constipation.   She has not added any other dietary intravenous contrast material. Post contrast coronal MIP imaging was performed. Dose reduction techniques were used.  Dose information is    transmitted to the [facility] NRDR ([address]) which includes the D Normal.  No visible focal wall thickening, lesion, or calculus. PELVIC NODES:  Normal.  No adenopathy. PELVIC ORGANS:  Normal.  No visible mass. Pelvic organs appropriate for patient age. BONES:  Mild degenerative changes at L2-3. Arch Jose   No bony le ;  Location: [address] [place] [address]  9/14/2015: INJ PARAVERT F JNT L/S 1 LEV Left      Comment: Procedure: SELECTIVE NERVE ROOT BLOCK;                 Surgeon: Debbie Russell DO;  Location: [address] [place] [address]  10/ Types: Cigarettes    Smokeless tobacco: Never Used                        Alcohol use: Yes           0.0 oz/week       Comment: socially    Drug use:  No              Sexual activity: Yes               Partners with: Male       Birth control/protection: Negative for difficulty urinating, dysuria, frequency and urgency. Musculoskeletal: Negative for arthralgias and myalgias. Skin: Negative for color change and rash. Neurological: Negative for tremors, syncope and weakness.    Hematological: Negative f present. Left cervical: No superficial cervical and no deep cervical adenopathy present. Right: No inguinal and no supraclavicular adenopathy present. Left: No inguinal and no supraclavicular adenopathy present.    Neurological: She is a that since she was given the diagnosis of diverticulosis and diverticulitis, she has started to take the generic equivalent of Benefiber on a daily basis. She takes it several times per day. This has given her softer and more regular bowel movements. operation, regarding diverticulitis, and regarding future diet. No orders of the defined types were placed in this encounter. Imaging & Referrals   None    Follow Up  Return in 3 weeks (on 7/26/2017).     David Kline MD

## 2017-07-07 RX ORDER — POLYETHYLENE GLYCOL 3350, SODIUM CHLORIDE, SODIUM BICARBONATE, POTASSIUM CHLORIDE 420; 11.2; 5.72; 1.48 G/4L; G/4L; G/4L; G/4L
POWDER, FOR SOLUTION ORAL
Qty: 1 BOTTLE | Refills: 0 | Status: SHIPPED | OUTPATIENT
Start: 2017-07-07 | End: 2017-08-08 | Stop reason: ALTCHOICE

## 2017-07-07 NOTE — PATIENT INSTRUCTIONS
I am seeing this patient in consultation regarding acute diverticulitis. This patient was initially evaluated in January of this year. She was in the hospital for diverticulitis. We saw her as a follow-Iup later in January.   We told her to call us wi within a long segment of the sigmoid colon ending at the rectosigmoid junction. The portion of colon involved with the diverticulitis go straight across the abdomen at the level just above the pubic symphysis. There was no free air or free fluid.     She

## 2017-07-10 ENCOUNTER — TELEPHONE (OUTPATIENT)
Dept: SURGERY | Facility: CLINIC | Age: 67
End: 2017-07-10

## 2017-07-10 DIAGNOSIS — K57.32 DIVERTICULITIS OF COLON (WITHOUT MENTION OF HEMORRHAGE)(562.11): Primary | ICD-10-CM

## 2017-07-10 RX ORDER — SODIUM CHLORIDE, SODIUM LACTATE, POTASSIUM CHLORIDE, CALCIUM CHLORIDE 600; 310; 30; 20 MG/100ML; MG/100ML; MG/100ML; MG/100ML
INJECTION, SOLUTION INTRAVENOUS CONTINUOUS
Status: CANCELLED | OUTPATIENT
Start: 2017-07-10

## 2017-07-10 RX ORDER — ALPRAZOLAM 0.5 MG/1
0.5 TABLET ORAL AS NEEDED
COMMUNITY
End: 2018-05-21

## 2017-07-17 ENCOUNTER — TELEPHONE (OUTPATIENT)
Dept: SURGERY | Facility: CLINIC | Age: 67
End: 2017-07-17

## 2017-07-17 ENCOUNTER — APPOINTMENT (OUTPATIENT)
Dept: LAB | Age: 67
End: 2017-07-17
Payer: COMMERCIAL

## 2017-07-17 DIAGNOSIS — K57.80 DIVERTICULITIS OF INTESTINE WITH PERFORATION AND ABSCESS WITHOUT BLEEDING, UNSPECIFIED PART OF INTESTINAL TRACT: ICD-10-CM

## 2017-07-17 LAB
BUN BLD-MCNC: 16 MG/DL (ref 8–20)
CALCIUM BLD-MCNC: 8.5 MG/DL (ref 8.3–10.3)
CHLORIDE: 110 MMOL/L (ref 101–111)
CO2: 22 MMOL/L (ref 22–32)
CREAT BLD-MCNC: 0.82 MG/DL (ref 0.55–1.02)
ERYTHROCYTE [DISTWIDTH] IN BLOOD BY AUTOMATED COUNT: 13 % (ref 11.5–16)
GLUCOSE BLD-MCNC: 85 MG/DL (ref 70–99)
HCT VFR BLD AUTO: 41.3 % (ref 34–50)
HGB BLD-MCNC: 13.3 G/DL (ref 12–16)
MCH RBC QN AUTO: 31.6 PG (ref 27–33.2)
MCHC RBC AUTO-ENTMCNC: 32.2 G/DL (ref 31–37)
MCV RBC AUTO: 98.1 FL (ref 81–100)
PLATELET # BLD AUTO: 316 10(3)UL (ref 150–450)
POTASSIUM SERPL-SCNC: 4.1 MMOL/L (ref 3.6–5.1)
RBC # BLD AUTO: 4.21 X10(6)UL (ref 3.8–5.1)
RED CELL DISTRIBUTION WIDTH-SD: 46.4 FL (ref 35.1–46.3)
SODIUM SERPL-SCNC: 142 MMOL/L (ref 136–144)
WBC # BLD AUTO: 4.9 X10(3) UL (ref 4–13)

## 2017-07-17 PROCEDURE — 36415 COLL VENOUS BLD VENIPUNCTURE: CPT

## 2017-07-17 PROCEDURE — 80048 BASIC METABOLIC PNL TOTAL CA: CPT

## 2017-07-17 PROCEDURE — 85027 COMPLETE CBC AUTOMATED: CPT

## 2017-07-17 NOTE — TELEPHONE ENCOUNTER
Pt had some questions about her scheduled surgery - answered these questions and pt verbalized understanding. Mailed ERAS protocol to pt.

## 2017-07-20 ENCOUNTER — TELEPHONE (OUTPATIENT)
Dept: SURGERY | Facility: CLINIC | Age: 67
End: 2017-07-20

## 2017-07-25 ENCOUNTER — ANESTHESIA EVENT (OUTPATIENT)
Dept: SURGERY | Facility: HOSPITAL | Age: 67
DRG: 330 | End: 2017-07-25
Payer: COMMERCIAL

## 2017-07-26 ENCOUNTER — HOSPITAL ENCOUNTER (INPATIENT)
Facility: HOSPITAL | Age: 67
LOS: 4 days | Discharge: HOME | DRG: 330 | End: 2017-07-30
Attending: COLON & RECTAL SURGERY | Admitting: COLON & RECTAL SURGERY
Payer: COMMERCIAL

## 2017-07-26 ENCOUNTER — ANESTHESIA (OUTPATIENT)
Dept: SURGERY | Facility: HOSPITAL | Age: 67
DRG: 330 | End: 2017-07-26
Payer: COMMERCIAL

## 2017-07-26 ENCOUNTER — SURGERY (OUTPATIENT)
Age: 67
End: 2017-07-26

## 2017-07-26 DIAGNOSIS — K57.92 DIVERTICULITIS OF INTESTINE WITHOUT PERFORATION OR ABSCESS WITHOUT BLEEDING, UNSPECIFIED PART OF INTESTINAL TRACT: ICD-10-CM

## 2017-07-26 DIAGNOSIS — K57.80 DIVERTICULITIS OF INTESTINE WITH PERFORATION AND ABSCESS WITHOUT BLEEDING, UNSPECIFIED PART OF INTESTINAL TRACT: Primary | ICD-10-CM

## 2017-07-26 PROCEDURE — S0028 INJECTION, FAMOTIDINE, 20 MG: HCPCS | Performed by: PHYSICIAN ASSISTANT

## 2017-07-26 PROCEDURE — 88307 TISSUE EXAM BY PATHOLOGIST: CPT | Performed by: COLON & RECTAL SURGERY

## 2017-07-26 PROCEDURE — 0DTP4ZZ RESECTION OF RECTUM, PERCUTANEOUS ENDOSCOPIC APPROACH: ICD-10-PCS | Performed by: COLON & RECTAL SURGERY

## 2017-07-26 PROCEDURE — 8E0W4CZ ROBOTIC ASSISTED PROCEDURE OF TRUNK REGION, PERCUTANEOUS ENDOSCOPIC APPROACH: ICD-10-PCS | Performed by: COLON & RECTAL SURGERY

## 2017-07-26 PROCEDURE — 0DTN4ZZ RESECTION OF SIGMOID COLON, PERCUTANEOUS ENDOSCOPIC APPROACH: ICD-10-PCS | Performed by: COLON & RECTAL SURGERY

## 2017-07-26 RX ORDER — FAMOTIDINE 20 MG/1
20 TABLET ORAL 2 TIMES DAILY
Status: DISCONTINUED | OUTPATIENT
Start: 2017-07-26 | End: 2017-07-30

## 2017-07-26 RX ORDER — ACETAMINOPHEN 10 MG/ML
1000 INJECTION, SOLUTION INTRAVENOUS EVERY 6 HOURS PRN
Status: DISCONTINUED | OUTPATIENT
Start: 2017-07-26 | End: 2017-07-29

## 2017-07-26 RX ORDER — ONDANSETRON 2 MG/ML
4 INJECTION INTRAMUSCULAR; INTRAVENOUS EVERY 6 HOURS PRN
Status: DISCONTINUED | OUTPATIENT
Start: 2017-07-26 | End: 2017-07-30

## 2017-07-26 RX ORDER — ACETAMINOPHEN 325 MG/1
325 TABLET ORAL EVERY 6 HOURS PRN
COMMUNITY
End: 2017-12-04

## 2017-07-26 RX ORDER — HYDROMORPHONE HYDROCHLORIDE 1 MG/ML
1 INJECTION, SOLUTION INTRAMUSCULAR; INTRAVENOUS; SUBCUTANEOUS EVERY 2 HOUR PRN
Status: DISCONTINUED | OUTPATIENT
Start: 2017-07-26 | End: 2017-07-30

## 2017-07-26 RX ORDER — SODIUM CHLORIDE 9 MG/ML
INJECTION, SOLUTION INTRAVENOUS CONTINUOUS
Status: DISCONTINUED | OUTPATIENT
Start: 2017-07-26 | End: 2017-07-26

## 2017-07-26 RX ORDER — HEPARIN SODIUM 5000 [USP'U]/ML
5000 INJECTION, SOLUTION INTRAVENOUS; SUBCUTANEOUS EVERY 8 HOURS
Status: DISCONTINUED | OUTPATIENT
Start: 2017-07-26 | End: 2017-07-30

## 2017-07-26 RX ORDER — METRONIDAZOLE 500 MG/100ML
500 INJECTION, SOLUTION INTRAVENOUS ONCE
Status: DISCONTINUED | OUTPATIENT
Start: 2017-07-26 | End: 2017-07-26 | Stop reason: HOSPADM

## 2017-07-26 RX ORDER — FAMOTIDINE 10 MG/ML
20 INJECTION, SOLUTION INTRAVENOUS 2 TIMES DAILY
Status: DISCONTINUED | OUTPATIENT
Start: 2017-07-26 | End: 2017-07-30

## 2017-07-26 RX ORDER — HEPARIN SODIUM 5000 [USP'U]/ML
5000 INJECTION, SOLUTION INTRAVENOUS; SUBCUTANEOUS ONCE
Status: COMPLETED | OUTPATIENT
Start: 2017-07-26 | End: 2017-07-26

## 2017-07-26 RX ORDER — MEPERIDINE HYDROCHLORIDE 25 MG/ML
INJECTION INTRAMUSCULAR; INTRAVENOUS; SUBCUTANEOUS
Status: COMPLETED
Start: 2017-07-26 | End: 2017-07-26

## 2017-07-26 RX ORDER — DEXTROSE, SODIUM CHLORIDE, AND POTASSIUM CHLORIDE 5; .45; .15 G/100ML; G/100ML; G/100ML
INJECTION INTRAVENOUS
Status: COMPLETED
Start: 2017-07-26 | End: 2017-07-26

## 2017-07-26 RX ORDER — ACETAMINOPHEN 500 MG
1000 TABLET ORAL ONCE
Status: COMPLETED | OUTPATIENT
Start: 2017-07-26 | End: 2017-07-26

## 2017-07-26 RX ORDER — SODIUM CHLORIDE, SODIUM LACTATE, POTASSIUM CHLORIDE, CALCIUM CHLORIDE 600; 310; 30; 20 MG/100ML; MG/100ML; MG/100ML; MG/100ML
INJECTION, SOLUTION INTRAVENOUS CONTINUOUS
Status: DISCONTINUED | OUTPATIENT
Start: 2017-07-26 | End: 2017-07-27

## 2017-07-26 RX ORDER — LORAZEPAM 2 MG/ML
0.5 INJECTION INTRAMUSCULAR EVERY 6 HOURS PRN
Status: DISCONTINUED | OUTPATIENT
Start: 2017-07-26 | End: 2017-07-30

## 2017-07-26 RX ORDER — KETOROLAC TROMETHAMINE 30 MG/ML
30 INJECTION, SOLUTION INTRAMUSCULAR; INTRAVENOUS EVERY 6 HOURS PRN
Status: DISPENSED | OUTPATIENT
Start: 2017-07-26 | End: 2017-07-29

## 2017-07-26 RX ORDER — KETOROLAC TROMETHAMINE 15 MG/ML
15 INJECTION, SOLUTION INTRAMUSCULAR; INTRAVENOUS EVERY 6 HOURS PRN
Status: ACTIVE | OUTPATIENT
Start: 2017-07-26 | End: 2017-07-29

## 2017-07-26 RX ORDER — DEXTROSE, SODIUM CHLORIDE, AND POTASSIUM CHLORIDE 5; .45; .15 G/100ML; G/100ML; G/100ML
INJECTION INTRAVENOUS CONTINUOUS
Status: DISCONTINUED | OUTPATIENT
Start: 2017-07-26 | End: 2017-07-29

## 2017-07-26 RX ORDER — CLONAZEPAM 1 MG/1
1 TABLET ORAL DAILY
COMMUNITY

## 2017-07-26 RX ORDER — HYDROMORPHONE HYDROCHLORIDE 1 MG/ML
0.5 INJECTION, SOLUTION INTRAMUSCULAR; INTRAVENOUS; SUBCUTANEOUS EVERY 2 HOUR PRN
Status: DISCONTINUED | OUTPATIENT
Start: 2017-07-26 | End: 2017-07-30

## 2017-07-26 RX ORDER — ACETAMINOPHEN 10 MG/ML
INJECTION, SOLUTION INTRAVENOUS
Status: COMPLETED
Start: 2017-07-26 | End: 2017-07-26

## 2017-07-26 RX ORDER — METRONIDAZOLE 500 MG/100ML
INJECTION, SOLUTION INTRAVENOUS
Status: DISCONTINUED | OUTPATIENT
Start: 2017-07-26 | End: 2017-07-26

## 2017-07-26 RX ORDER — NALOXONE HYDROCHLORIDE 0.4 MG/ML
80 INJECTION, SOLUTION INTRAMUSCULAR; INTRAVENOUS; SUBCUTANEOUS AS NEEDED
Status: DISCONTINUED | OUTPATIENT
Start: 2017-07-26 | End: 2017-07-26 | Stop reason: HOSPADM

## 2017-07-26 RX ORDER — ONDANSETRON 2 MG/ML
4 INJECTION INTRAMUSCULAR; INTRAVENOUS AS NEEDED
Status: DISCONTINUED | OUTPATIENT
Start: 2017-07-26 | End: 2017-07-26 | Stop reason: HOSPADM

## 2017-07-26 RX ORDER — BUPIVACAINE HYDROCHLORIDE AND EPINEPHRINE 5; 5 MG/ML; UG/ML
INJECTION, SOLUTION EPIDURAL; INTRACAUDAL; PERINEURAL AS NEEDED
Status: DISCONTINUED | OUTPATIENT
Start: 2017-07-26 | End: 2017-07-26 | Stop reason: HOSPADM

## 2017-07-26 RX ORDER — SODIUM PHOSPHATE, DIBASIC AND SODIUM PHOSPHATE, MONOBASIC 7; 19 G/133ML; G/133ML
1 ENEMA RECTAL ONCE AS NEEDED
Status: DISCONTINUED | OUTPATIENT
Start: 2017-07-26 | End: 2017-07-26 | Stop reason: HOSPADM

## 2017-07-26 RX ORDER — MEPERIDINE HYDROCHLORIDE 25 MG/ML
12.5 INJECTION INTRAMUSCULAR; INTRAVENOUS; SUBCUTANEOUS
Status: ACTIVE | OUTPATIENT
Start: 2017-07-26 | End: 2017-07-26

## 2017-07-26 NOTE — H&P
Active Problems      1. Diverticulitis of intestine without perforation or abscess without bleeding, unspecified part of intestinal tract    2. Acute diverticulitis    3.  Family history of colon cancer          Chief Complaint     History of Diverticulitis added any other dietary fiber to her diet. She is relying solely on the generic Benefiber. I personally reviewed the CT scan obtained in January of this year at [facility].  I have provided my own interpretation.   It does show very long segment o There is a pancreatic duct isn't identified. The pancreatic duct is at the limits of normal measuring 2 mm. SPLEEN:  Normal.  No enlargement or focal lesion.      KIDNEYS:  Small lesions within the kidneys which are too small to characterize which likely Past Medical / Surgical / Social / Family History    The past medical and past surgical history have been reviewed by me today.           Past Medical History:   Diagnosis Date   • Anxiety     • Back problem       c4-6 fusion, LESI   • History of basal ce social history have been reviewed by me today.             Family History   Problem Relation Age of Onset   • Lung Disorder Father            • Heart Disease Father     • Other[Other] [OTHER] Mother         emph   • High Blood Pressure Mother ClonazePAM (KLONOPIN) 0.5 MG Oral Tab Take 1 mg by mouth nightly as needed. Disp:  Rfl:    levofloxacin (LEVAQUIN) 500 MG Oral Tab Take 1 tablet (500 mg total) by mouth daily.  Disp: 10 tablet Rfl: 0   Cholecalciferol (VITAMIN D3) 1000 UNITS Oral Cap Ta murmur heard. Pulmonary/Chest: Effort normal and breath sounds normal. No accessory muscle usage. No tachypnea. No respiratory distress. She has no decreased breath sounds. She has no wheezes. She has no rhonchi. She has no rales. She exhibits no mass. was started on antibiotics by calling our answering service. She started them on Maggi 10, 2017. She is taking her last doses today. She states that her pain was across the belt line. It was very severe for 2 days. It was 7/10.   It was constant with Abdominal exam is soft, nondistended, nontender, good bowel sounds. She has no guarding or rebound. There are no masses. She has no inguinal or umbilical hernias present. There is no evidence of ascites.   The liver is within normal limits, spleen is

## 2017-07-26 NOTE — ANESTHESIA PREPROCEDURE EVALUATION
PRE-OP EVALUATION    Patient Name: Stacey Taylor    Pre-op Diagnosis: Diverticulitis of intestine without perforation or abscess without bleeding, unspecified part of intestinal tract [K57.92]    Procedure(s):  ROBOTIC LAPAROSCOPIC LOW ANTERIOR COLON RESEC Estradiol (VAGIFEM) 10 MCG Vaginal Tab Place 10 mcg vaginally twice a week. Monday and Thursday. Disp:  Rfl:        Allergies: Codeine [Opioid Analgesics]      Anesthesia Evaluation    Patient summary reviewed.     Anesthetic Complications  (-) history of a 02/10/2012: MUSC/TENDON REPAIR EACH; ARM/ELBOW      Comment: R wrist  No date: OTHER Right      Comment: wrist tendon repair  2005: OTHER SURGICAL HISTORY      Comment: neck surgery 5 yrs ago  2008: OTHER SURGICAL HISTORY      Comment: cyst -Ovarian  No da

## 2017-07-26 NOTE — ANESTHESIA POSTPROCEDURE EVALUATION
Haseeb Roberts Patient Status:  Surgery Admit   Age/Gender 61year old female MRN [id number]   Location [address] [facility] Attending Keith Dumont MD   Hosp Day # 0 PCP MILENA PITT       Anesthesia Post-op Note    P

## 2017-07-26 NOTE — OR NURSING
of the patient wanted it noted that during her last hospitalization she had \"bed sores\" and wanted the nursing staff to be aware of it for this hospital stay

## 2017-07-26 NOTE — OPERATIVE REPORT
[facility]  Operative Note    Anita Lujan Location: OR   CSN [id number] MRN [id number]   Admission Date 7/26/2017 Operation Date 7/26/2017   Attending Physician Virginie Hermosillo MD Operating Physician Lizeth Owen MD     Pre-Operative Diagnosis: Divert liver, stomach, duodenum, anterior surfaces of the intestine omentum all be within normal limits.   The appendix is normal.  The uterus tubes and ovaries are normal.    The patient underwent an uncomplicated double stapled anastomosis with a 25 mm EEA stapl the right paracolic gutter and right pelvic sidewall and down the peritoneal attachments in the lateral rectal stalks in that region.   Once we were assured that the rectum was easily mobilized out of the pelvis, we exposed the site of the planned resection then reduced below the peritoneum. The Pfannenstiel incision was closed in layers. The deep layer is 2-0 Vicryl. The transverse incision through the anterior fascia was closed with a running #1 PDS suture line.     Under laparoscopic visualization, the h 100 cc    Drains: One large Jony drain    Complications: None

## 2017-07-27 PROCEDURE — S0028 INJECTION, FAMOTIDINE, 20 MG: HCPCS | Performed by: PHYSICIAN ASSISTANT

## 2017-07-27 NOTE — PLAN OF CARE
Pt arrived to the unit via bed from PACU. Pt drowsy; eyes closed but easily opens to answer questions. Pt denied any nausea; abdominal lap sites x4 intact with steri-strips MICHEAL; scant amt bloody drainage notes to lap sites.   AMARILIS to right abd patent of ser

## 2017-07-27 NOTE — PROGRESS NOTES
[facility]  Progress Note    Constancia Comfort Patient Status:  Inpatient    1950 MRN [id number]   [facility] 3NW-A Attending Brenda Bronson MD   Hosp Day # 1 PCP MILENA PITT     Subjective:  No new complaints.  Expected incisional ellie swabs for comfort  4. Encouraged to ambulate  5. DVT prophylaxis    My total face time with this patient was 21 minutes. Greater than half of our visit was spent in counseling the patient on the above listed diagnoses and treatment options.     Debbie Reynoso

## 2017-07-28 PROCEDURE — S0028 INJECTION, FAMOTIDINE, 20 MG: HCPCS | Performed by: PHYSICIAN ASSISTANT

## 2017-07-28 NOTE — PLAN OF CARE
PAIN - ADULT    • Verbalizes/displays adequate comfort level or patient's stated pain goal Progressing          GASTROINTESTINAL - ADULT    • Minimal or absence of nausea and vomiting Progressing        VSS, afebrile. Remains NPO, denies N/V.  C/O gas ellie

## 2017-07-28 NOTE — PROGRESS NOTES
[facility]  Progress Note    Isaias Gu Patient Status:  Inpatient    1950 MRN [id number]   [facility] 3NW-A Attending So Erickson MD   Hosp Day # 2 PCP MILENA PITT     Subjective:  Walking frequently, pain with movement but fluids, antiemetics, analgesia  4. Maintain strict NPO  5. Encouraged to ambulate  6. DVT prophylaxis    My total face time with this patient was 21 minutes.   Greater than half of our visit was spent in counseling the patient on the above listed diagnoses

## 2017-07-28 NOTE — PAYOR COMM NOTE
--------------  ADMISSION REVIEW     Payor: [address]  Subscriber #:  [id number]  Authorization Number: [id number]    Admit date: 7/26/17  Admit time: 1837       Admitting Physician: Samuel De La Vega MD  Attending Physician:  Samuel De La Vega MD took place in March of 2017. She was noted to have diverticulosis. There were no other findings. There are specifically no polyps or colitis. The patient has no current pain or symptoms. She is tolerating a diet.      The patient states that since s diarrhea for 1 week prior to above symptoms. 61 cc of Omnipaque 350                 FINDINGS:     LIVER:  Normal.  No enlargement, atrophy, abnormal density, or significant focal lesion. BILIARY:  Gallbladder is mildly distended.  There is pro inflammation in the pelvis from acute sigmoid diverticulitis. There is no evidence of abscess or free air. 2. Prominence of the common bile duct and biliary tree could be due to prolonged n.p.o. status.  If there are clinical or laboratory signs of bili STEROID INJECTION;  Surgeon: Jesus Hopper DO;  Location: [address] [place] [address]  8/3/2015: M-SEDAJ BY [facility] [address] [address] [facility] [address]  [address] [facility] 5+ YR N/A      Comment: Procedure: FACET INJECTION UNDER FLUOROSCOPY;                Surgeon: Jesus Hopper Rfl:    ClonazePAM (KLONOPIN) 0.5 MG Oral Tab Take 1 mg by mouth nightly as needed. Disp:  Rfl:    levofloxacin (LEVAQUIN) 500 MG Oral Tab Take 1 tablet (500 mg total) by mouth daily.  Disp: 10 tablet Rfl: 0   Cholecalciferol (VITAMIN D3) 1000 UNITS Oral No murmur heard. Pulmonary/Chest: Effort normal and breath sounds normal. No accessory muscle usage. No tachypnea. No respiratory distress. She has no decreased breath sounds. She has no wheezes. She has no rhonchi. She has no rales.  She exhibits no mas She was started on antibiotics by calling our answering service. She started them on Maggi 10, 2017. She is taking her last doses today. She states that her pain was across the belt line. It was very severe for 2 days. It was 7/10.   It was constant Abdominal exam is soft, nondistended, nontender, good bowel sounds. She has no guarding or rebound. There are no masses. She has no inguinal or umbilical hernias present. There is no evidence of ascites.   The liver is within normal limits, spleen is Action Dose Route User    7/28/2017 0429 New [address] (none) Intravenous Dharmesh Espinosa RN    8/22/9268 2223 New Bag (none) Intravenous Dharmesh Espinosa RN      ketorolac tromethamine (TORADOL) 30 MG/ML injection 30 mg     Date Action Dose Route User    7/28/20 included the primary disease. We then had to resect a portion of the rectum that was involved with secondary disease from the overlying infection.   She ends up with a very low anastomosis at approximately 6 cm from the anal verge.     The remaining diagno identify the left ureter.     With a window in the rectosigmoid mesentery and the inferior mesenteric artery exposed, we did divide the inferior mesenteric artery at its origin from the aorta.     The dissection was then carried down into the pelvis along portion of rectum it was ultimately removed via an Endobag through the right lower quadrant trocar site.     We then performed the anastomosis.     EEA sizers were used to select the correct instrument for staple anastomosis.     The EEA anvil was secured with epinephrine was injected into all wound margins.  Steri-Strips are placed over benzoin adhesive.     The patient was delivered to recovery room in stable postoperative condition.           Pathologic specimens: Rectosigmoid colon, second portion of the

## 2017-07-28 NOTE — PROGRESS NOTES
Pt resting comfortably in bed at present. Vss. Pt reports she passed large amount of flatus this afternoon and did have small loose brown bm and this afternoon. Remains npo. Reports relief in intensity in abdominal pain.  Incision sites c/d/i. neftaly drain in p

## 2017-07-29 RX ORDER — ACETAMINOPHEN 325 MG/1
TABLET ORAL
Status: DISPENSED
Start: 2017-07-29 | End: 2017-07-29

## 2017-07-29 RX ORDER — ACETAMINOPHEN 325 MG/1
650 TABLET ORAL EVERY 6 HOURS PRN
Status: DISCONTINUED | OUTPATIENT
Start: 2017-07-29 | End: 2017-07-30

## 2017-07-29 RX ORDER — ACETAMINOPHEN 325 MG/1
TABLET ORAL
Status: DISPENSED
Start: 2017-07-29 | End: 2017-07-30

## 2017-07-29 NOTE — PLAN OF CARE
Problem: PAIN - ADULT  Goal: Verbalizes/displays adequate comfort level or patient's stated pain goal  INTERVENTIONS:  - Encourage pt to monitor pain and request assistance  - Assess pain using appropriate pain scale  - Administer analgesics based on type development  - Assess and document skin integrity  - Assess and document dressing/incision, wound bed, drain sites and surrounding tissue  - Implement wound care per orders  - Initiate isolation precautions as appropriate  - Initiate Pressure Ulcer prevent

## 2017-07-29 NOTE — PROGRESS NOTES
[facility]  Progress Note    Quirino Dumont Patient Status:  Inpatient    1950 MRN [id number]   [facility] 3NW-A Attending Kristine Hurley MD   Hosp Day # 3 PCP MILENA PITT     Subjective:  No new complaints, incisional pain.   This shy.     Palak Velez  7/29/2017  8:40 AM

## 2017-07-30 VITALS
RESPIRATION RATE: 16 BRPM | WEIGHT: 113 LBS | TEMPERATURE: 98 F | HEART RATE: 70 BPM | BODY MASS INDEX: 20.8 KG/M2 | HEIGHT: 62 IN | DIASTOLIC BLOOD PRESSURE: 77 MMHG | SYSTOLIC BLOOD PRESSURE: 121 MMHG | OXYGEN SATURATION: 97 %

## 2017-07-30 NOTE — PLAN OF CARE
Pt has been tolerating soft diet all day. Pt has not taken any analgesia today. AMARILIS drain care done.

## 2017-07-30 NOTE — PLAN OF CARE
Per Dr Nehemias Palacio before discharge; Pansy Stabs removed without difficulty; dry gauze applied. Pt instructed to keep gauze over AMARILIS site until drainage stops. Discharge instructions gone over and given to pt- no RX needed.

## 2017-07-31 ENCOUNTER — TELEPHONE (OUTPATIENT)
Dept: SURGERY | Facility: CLINIC | Age: 67
End: 2017-07-31

## 2017-07-31 NOTE — PROGRESS NOTES
[facility]  Progress Note    Stacey Taylor Patient Status:  Inpatient    1950 MRN [id number]   [facility] 3NW-A Attending No att. providers found   Hosp Day # 4 PCP MILENA PITT     Subjective: The patient is feeling very well.   Joaquim Trujillo

## 2017-08-01 NOTE — DISCHARGE SUMMARY
[facility]  Discharge Summary    Danielle Gan Patient Status:  Inpatient    1950 MRN [id number]   [facility] 3NW-A Attending No att. providers found   Hosp Day # 4 PCP Damon Blanco     Date of Admission: 2017    Date of Disch Tab  Take 325 mg by mouth every 6 (six) hours as needed for Pain., Historical    ClonazePAM 1 MG Oral Tab  Take 1 mg by mouth daily. , Historical    Neomycin Sulfate 500 MG Oral Tab  Take 2 tablets by mouth at 1 pm, 2 pm, and 11 pm, Normal, Disp-6 tablet, R

## 2017-08-08 ENCOUNTER — OFFICE VISIT (OUTPATIENT)
Dept: SURGERY | Facility: CLINIC | Age: 67
End: 2017-08-08

## 2017-08-08 VITALS
TEMPERATURE: 98 F | HEIGHT: 62 IN | DIASTOLIC BLOOD PRESSURE: 75 MMHG | BODY MASS INDEX: 20.24 KG/M2 | SYSTOLIC BLOOD PRESSURE: 110 MMHG | HEART RATE: 71 BPM | WEIGHT: 110 LBS

## 2017-08-08 DIAGNOSIS — K64.8 INTERNAL HEMORRHOIDS WITH COMPLICATION: ICD-10-CM

## 2017-08-08 DIAGNOSIS — K57.92 ACUTE DIVERTICULITIS: Primary | ICD-10-CM

## 2017-08-08 DIAGNOSIS — K64.4 EXTERNAL HEMORRHOIDS WITH COMPLICATION: ICD-10-CM

## 2017-08-08 PROCEDURE — 99024 POSTOP FOLLOW-UP VISIT: CPT | Performed by: PHYSICIAN ASSISTANT

## 2017-08-08 RX ORDER — LIDOCAINE 50 MG/G
1 CREAM RECTAL 3 TIMES DAILY PRN
Qty: 28.3 G | Refills: 1 | Status: SHIPPED | OUTPATIENT
Start: 2017-08-08 | End: 2017-12-04

## 2017-08-08 NOTE — PROGRESS NOTES
Post Operative Visit Note       Active Problems  1. Acute diverticulitis    2. Internal hemorrhoids with complication    3.  External hemorrhoids with complication         Chief Complaint   Patient presents with:  Post-Op: 1st post op visit-- Laparoscopic r fusion, LESI; lumbar   • Colitis    • Diverticulosis of large intestine    • Hiatal hernia    • History of basal cell cancer 10/29/09    nose   • History of basal cell carcinoma 5/29/07    chest   • OTHER DISEASES     restless leg syndrome   • Pneumonia, o me today.     Family History   Problem Relation Age of Onset   • High Blood Pressure Mother         • High Cholesterol Mother    • Cancer Mother      colon   • Lung Disorder Mother      blood clot   • Heart Disease Mother    • Other Gisell Jc Mother Estradiol (JINTELI) 1-5 MG-MCG Oral Tab Take 1 tablet by mouth daily. Disp:  Rfl:    Estradiol (VAGIFEM) 10 MCG Vaginal Tab Place 10 mcg vaginally twice a week. Monday and Thursday.  Disp:  Rfl:          Review of Systems  The Review of Systems has been rev Soft. Bowel sounds are normal. She exhibits no distension. There is no tenderness. There is no rebound and no guarding. Incision(s) clean, dry, intact, without erythema or cellulitis. Musculoskeletal: Normal range of motion. She exhibits no edema. at this time if it can be avoided. I have instructed the patient on conservative management of hemorrhoids including Preparation H/witch hazel wipes, sitz baths, and topical anesthetics.   I have prescribed the patient directed care, as she has been having

## 2017-08-10 ENCOUNTER — TELEPHONE (OUTPATIENT)
Dept: SURGERY | Facility: CLINIC | Age: 67
End: 2017-08-10

## 2017-08-10 RX ORDER — LIDOCAINE 50 MG/G
1 CREAM RECTAL 3 TIMES DAILY PRN
Qty: 28.3 G | Refills: 1 | Status: CANCELLED | OUTPATIENT
Start: 2017-08-10

## 2017-08-10 RX ORDER — FLUCONAZOLE 200 MG/1
200 TABLET ORAL DAILY
Qty: 14 TABLET | Refills: 1 | Status: SHIPPED | OUTPATIENT
Start: 2017-08-10 | End: 2017-08-11

## 2017-08-10 NOTE — TELEPHONE ENCOUNTER
Seen on 8/8 post colon resection no issues, 7/31 had oral  thrush and put on diflucan for 7 days - it is now back, order received from Dr. Faviola Alvarez and order sent.

## 2017-08-11 RX ORDER — FLUCONAZOLE 200 MG/1
200 TABLET ORAL DAILY
Qty: 14 TABLET | Refills: 0 | Status: SHIPPED | OUTPATIENT
Start: 2017-08-11 | End: 2017-08-25

## 2017-08-31 ENCOUNTER — OFFICE VISIT (OUTPATIENT)
Dept: SURGERY | Facility: CLINIC | Age: 67
End: 2017-08-31

## 2017-08-31 VITALS
RESPIRATION RATE: 18 BRPM | HEART RATE: 79 BPM | HEIGHT: 62 IN | SYSTOLIC BLOOD PRESSURE: 93 MMHG | BODY MASS INDEX: 20.61 KG/M2 | DIASTOLIC BLOOD PRESSURE: 63 MMHG | WEIGHT: 112 LBS

## 2017-08-31 DIAGNOSIS — K57.92 ACUTE DIVERTICULITIS: Primary | ICD-10-CM

## 2017-08-31 PROCEDURE — 99024 POSTOP FOLLOW-UP VISIT: CPT | Performed by: COLON & RECTAL SURGERY

## 2017-08-31 RX ORDER — LIDOCAINE HYDROCHLORIDE AND HYDROCORTISONE ACETATE 30; 25 MG/G; MG/G
1 GEL RECTAL 2 TIMES DAILY
Qty: 1 KIT | Refills: 2 | Status: SHIPPED | OUTPATIENT
Start: 2017-08-31 | End: 2019-02-16 | Stop reason: ALTCHOICE

## 2017-08-31 NOTE — PATIENT INSTRUCTIONS
This patient presents after her laparoscopic low anterior resection of the rectosigmoid colon and July 26, 2017. She is still having some pain at the incision sites. She has increased her activity significantly.   She states that her incision site iss

## 2017-08-31 NOTE — PROGRESS NOTES
Follow Up Visit Note       Active Problems      1. Acute diverticulitis          Chief Complaint   Patient presents with:  Diverticulitis: 3 week further care and treatment Acute diverticulitis.  still some right side abdominal discomfot if she over does it DO EDWARD;  Location: [address] [place] [address]  8/3/2015: INJ PARAVERT F JNT L/S 1 LEV N/A      Comment: Procedure: FACET INJECTION UNDER FLUOROSCOPY;                Surgeon: Yao Freeman DO;  Location: [address] [place] [address] Years of education:                 Number of children:               Social History Main Topics    Smoking status: Former Smoker                                                                Packs/day: 0.00      Years: 15.00          Types: Cigarettes Cardiovascular: Negative for chest pain, palpitations and leg swelling. Gastrointestinal: Negative for abdominal distention, abdominal pain, anal bleeding, blood in stool, constipation, diarrhea, nausea and vomiting.    Genitourinary: Negative for diffi maximal symptoms in the mid abdomen. She points to her right lower quadrant trocar site is a point of symptoms as well. Bowel sounds abnormal activity normal pitch. There are no complications at the incision sites.     This patient is doing extremely wel

## 2017-12-04 PROCEDURE — 87624 HPV HI-RISK TYP POOLED RSLT: CPT | Performed by: OBSTETRICS & GYNECOLOGY

## 2017-12-04 PROCEDURE — 88175 CYTOPATH C/V AUTO FLUID REDO: CPT | Performed by: OBSTETRICS & GYNECOLOGY

## 2017-12-18 ENCOUNTER — TELEPHONE (OUTPATIENT)
Dept: SURGERY | Facility: CLINIC | Age: 67
End: 2017-12-18

## 2017-12-26 ENCOUNTER — OFFICE VISIT (OUTPATIENT)
Dept: SURGERY | Facility: CLINIC | Age: 67
End: 2017-12-26

## 2017-12-26 VITALS
BODY MASS INDEX: 21.53 KG/M2 | WEIGHT: 117 LBS | SYSTOLIC BLOOD PRESSURE: 126 MMHG | HEIGHT: 62 IN | TEMPERATURE: 98 F | HEART RATE: 63 BPM | DIASTOLIC BLOOD PRESSURE: 77 MMHG

## 2017-12-26 DIAGNOSIS — K57.92 DIVERTICULITIS: Primary | ICD-10-CM

## 2017-12-26 DIAGNOSIS — R10.30 LOWER ABDOMINAL PAIN: ICD-10-CM

## 2017-12-26 PROCEDURE — 99214 OFFICE O/P EST MOD 30 MIN: CPT | Performed by: COLON & RECTAL SURGERY

## 2017-12-26 NOTE — PROGRESS NOTES
Follow Up Visit Note       Active Problems      No diagnosis found. Chief Complaint   Patient presents with:  Diverticulitis: s/p LAR done 7/26/17. c/o on/off discomfort.  hx diverticulitis        History of Present Illness        Allergies  Alma Michael is a Procedure: FACET INJECTION UNDER FLUOROSCOPY;                Surgeon: Skyler Manrique DO;  Location: [address] [place] [address]  06/2015: MRI, LUMBAR SPINE  02/10/2012: MUSC/TENDON REPAIR EACH; ARM/ELBOW      Comment: R wrist  No date: OTHER times daily. Disp:  Rfl:    triamcinolone acetonide 0.1 % External Cream Apply topically 2 (two) times daily. Disp:  Rfl:    Norethindrone-Eth Estradiol 0.5-2.5 MG-MCG Oral Tab Take 1 tablet by mouth daily.  Disp: 3 Package Rfl: 4   Estradiol (VAGIFEM) 10 M defined types were placed in this encounter. Imaging & Referrals   None    Follow Up  No Follow-up on file.     Rosemary Kathleen MD

## 2017-12-26 NOTE — PROGRESS NOTES
Follow Up Visit Note       Active Problems      1. Diverticulitis    2. Lower abdominal pain          Chief Complaint   Patient presents with:  Abdominal Pain: s/p LAR done 7/26/17. c/o on/off discomfort for last month.  hx diverticulitis        History of c4-6 fusion, LESI; lumbar   • Colitis    • Diverticulosis of large intestine    • Hiatal hernia    • History of basal cell cancer 10/29/09    nose   • History of basal cell carcinoma 5/29/07    chest   • Pneumonia, organism unspecified(486)    • Restles today.     Family History   Problem Relation Age of Onset   • High Blood Pressure Mother         • High Cholesterol Mother    • Cancer Mother      colon   • Lung Disorder Mother      blood clot   • Heart Disease Mother    • Other Paulden Dy Mother mouth daily. Disp:  Rfl:    ALPRAZolam 0.5 MG Oral Tab Take 0.5 mg by mouth as needed for Sleep. Disp:  Rfl:    ibuprofen (ADVIL) 200 MG Oral Tab Take 200 mg by mouth every 6 (six) hours as needed for Pain.  Disp:  Rfl:    Estradiol (VAGIFEM) 10 MCG Vaginal left inguinal area. Clinical examination reveals her abdomen to be soft, very slightly tender in the lower quadrants across the belt line. She has no guarding or rebound. There are no masses. There is no evidence of ascites.   There are no inguinal There are no masses. There is no evidence of ascites. There are no inguinal or umbilical hernias present. She has a very small Pfannenstiel incision. She has barely visible incisions elsewhere from the laparoscopic robotic technique.   There are no inci

## 2017-12-27 ENCOUNTER — HOSPITAL ENCOUNTER (OUTPATIENT)
Dept: CT IMAGING | Facility: HOSPITAL | Age: 67
Discharge: HOME | End: 2017-12-27
Attending: COLON & RECTAL SURGERY
Payer: COMMERCIAL

## 2017-12-27 DIAGNOSIS — K57.92 DIVERTICULITIS: ICD-10-CM

## 2017-12-27 PROCEDURE — 74177 CT ABD & PELVIS W/CONTRAST: CPT | Performed by: COLON & RECTAL SURGERY

## 2017-12-27 PROCEDURE — 82565 ASSAY OF CREATININE: CPT

## 2017-12-27 NOTE — PATIENT INSTRUCTIONS
This patient presents for consultation regarding lower abdominal pain. This patient underwent a laparoscopic robotic low anterior resection of the rectosigmoid colon on July 26, 2017.     She states that a few weeks ago, she finally felt better and compl states that she is definitely exerting herself quite a bit with routine activities. Despite this, we would not expect this kind of trouble with a limited incision, this far remote from surgery.     We cannot entirely exclude the recurrence of her diverti

## 2018-05-21 PROBLEM — M54.2 CERVICALGIA: Status: ACTIVE | Noted: 2018-05-21

## 2018-05-21 PROBLEM — M50.30 DEGENERATIVE DISC DISEASE, CERVICAL: Status: ACTIVE | Noted: 2018-05-21

## 2018-08-08 ENCOUNTER — HOSPITAL ENCOUNTER (OUTPATIENT)
Dept: GENERAL RADIOLOGY | Age: 68
Discharge: HOME | End: 2018-08-08
Attending: FAMILY MEDICINE
Payer: COMMERCIAL

## 2018-08-08 DIAGNOSIS — J18.9: ICD-10-CM

## 2018-08-08 PROCEDURE — 71046 X-RAY EXAM CHEST 2 VIEWS: CPT | Performed by: FAMILY MEDICINE

## 2018-08-22 ENCOUNTER — TELEPHONE (OUTPATIENT)
Dept: SURGERY | Facility: CLINIC | Age: 68
End: 2018-08-22

## 2018-08-22 ENCOUNTER — HOSPITAL ENCOUNTER (OUTPATIENT)
Dept: CT IMAGING | Facility: HOSPITAL | Age: 68
Discharge: HOME | End: 2018-08-22
Attending: PHYSICIAN ASSISTANT
Payer: COMMERCIAL

## 2018-08-22 ENCOUNTER — OFFICE VISIT (OUTPATIENT)
Dept: SURGERY | Facility: CLINIC | Age: 68
End: 2018-08-22
Payer: COMMERCIAL

## 2018-08-22 VITALS
RESPIRATION RATE: 15 BRPM | DIASTOLIC BLOOD PRESSURE: 66 MMHG | HEART RATE: 78 BPM | WEIGHT: 112.63 LBS | SYSTOLIC BLOOD PRESSURE: 101 MMHG | TEMPERATURE: 98 F | BODY MASS INDEX: 21 KG/M2

## 2018-08-22 DIAGNOSIS — K57.92 DIVERTICULITIS: ICD-10-CM

## 2018-08-22 DIAGNOSIS — K57.92 DIVERTICULITIS: Primary | ICD-10-CM

## 2018-08-22 DIAGNOSIS — K57.30 DIVERTICULOSIS OF LARGE INTESTINE WITHOUT HEMORRHAGE: Primary | ICD-10-CM

## 2018-08-22 DIAGNOSIS — Z90.49 HISTORY OF COLON RESECTION: ICD-10-CM

## 2018-08-22 DIAGNOSIS — R10.30 LOWER ABDOMINAL PAIN: ICD-10-CM

## 2018-08-22 PROBLEM — R10.9 ABDOMINAL PAIN: Status: ACTIVE | Noted: 2018-08-22

## 2018-08-22 LAB — CREAT SERPL-MCNC: 0.6 MG/DL (ref 0.55–1.02)

## 2018-08-22 PROCEDURE — 74177 CT ABD & PELVIS W/CONTRAST: CPT | Performed by: COLON & RECTAL SURGERY

## 2018-08-22 PROCEDURE — 99213 OFFICE O/P EST LOW 20 MIN: CPT | Performed by: PHYSICIAN ASSISTANT

## 2018-08-22 PROCEDURE — 82565 ASSAY OF CREATININE: CPT

## 2018-08-22 RX ORDER — METRONIDAZOLE 250 MG/1
250 TABLET ORAL 3 TIMES DAILY
Qty: 30 TABLET | Refills: 0 | Status: SHIPPED | OUTPATIENT
Start: 2018-08-22 | End: 2019-02-16 | Stop reason: ALTCHOICE

## 2018-08-22 RX ORDER — LEVOFLOXACIN 500 MG/1
500 TABLET, FILM COATED ORAL DAILY
Qty: 10 TABLET | Refills: 0 | Status: SHIPPED | OUTPATIENT
Start: 2018-08-22 | End: 2018-09-01

## 2018-08-22 NOTE — TELEPHONE ENCOUNTER
Wife having dull abdominal pain with fever and has history of colon resection for diverticulitis. appt made.

## 2018-08-22 NOTE — PROGRESS NOTES
Patient has appt scheduled. Please refer to TE's from today.   8/30/2018  2:00 PM    Juventino Nowak MD          [facility]

## 2018-08-22 NOTE — PROGRESS NOTES
Post Operative Visit Note       Active Problems  1. Diverticulosis of large intestine without hemorrhage    2. Lower abdominal pain    3. History of colon resection         Chief Complaint   Patient presents with:  Abdominal Pain: Onset 4 days.       Histor Past Medical History:   Diagnosis Date   • Anxiety    • Back problem     c4-6 fusion, LESI; lumbar   • Colitis    • Diverticulosis of large intestine    • Hiatal hernia    • History of basal cell cancer 10/29/09    nose   • History of basal cell carcinom TONSILLECTOMY    The family history and social history have been reviewed by me today.     Family History   Problem Relation Age of Onset   • High Blood Pressure Mother         • High Cholesterol Mother    • Cancer Mother      colon   • Lung Disorde Disp: 1 kit Rfl: 2         Review of Systems  The Review of Systems has been reviewed by me during today. Review of Systems   Constitutional: Negative for chills, diaphoresis, fatigue, fever and unexpected weight change.    HENT: Negative for hearing loss, midline. The abdomen is nondistended with normal bowel sounds present. Patient has well-healed incisions from her previous laparoscopic surgery. There are no umbilical, inguinal, or incisional hernias present.   The patient has very mild voluntary guardi

## 2018-08-22 NOTE — TELEPHONE ENCOUNTER
Pt on way home from hospital with spouse. Just had imaging CT of abdomen and pelvis, Talked to ENMA LEE who instructed pt clear liquids after todays appt. This RN recommends to continue clear liquids today if tolerating, and then increasing to bland die

## 2018-08-27 ENCOUNTER — TELEPHONE (OUTPATIENT)
Dept: SURGERY | Facility: CLINIC | Age: 68
End: 2018-08-27

## 2018-08-27 DIAGNOSIS — B37.0 ORAL CANDIDIASIS: Primary | ICD-10-CM

## 2018-08-27 RX ORDER — FLUCONAZOLE 200 MG/1
200 TABLET ORAL DAILY
Qty: 3 TABLET | Refills: 0 | Status: SHIPPED | OUTPATIENT
Start: 2018-08-27 | End: 2018-08-27

## 2018-08-27 RX ORDER — FLUCONAZOLE 200 MG/1
200 TABLET ORAL DAILY
Qty: 3 TABLET | Refills: 0 | Status: SHIPPED | OUTPATIENT
Start: 2018-08-27 | End: 2018-08-30

## 2018-08-30 ENCOUNTER — OFFICE VISIT (OUTPATIENT)
Dept: SURGERY | Facility: CLINIC | Age: 68
End: 2018-08-30
Payer: COMMERCIAL

## 2018-08-30 VITALS
WEIGHT: 111.38 LBS | HEIGHT: 62 IN | HEART RATE: 82 BPM | BODY MASS INDEX: 20.5 KG/M2 | SYSTOLIC BLOOD PRESSURE: 117 MMHG | DIASTOLIC BLOOD PRESSURE: 67 MMHG

## 2018-08-30 DIAGNOSIS — Z80.0 FAMILY HISTORY OF COLON CANCER: ICD-10-CM

## 2018-08-30 DIAGNOSIS — K57.32 DIVERTICULITIS OF LARGE INTESTINE WITHOUT PERFORATION OR ABSCESS WITHOUT BLEEDING: Primary | ICD-10-CM

## 2018-08-30 DIAGNOSIS — R10.30 LOWER ABDOMINAL PAIN: ICD-10-CM

## 2018-08-30 DIAGNOSIS — Z90.49 HISTORY OF COLON RESECTION: ICD-10-CM

## 2018-08-30 DIAGNOSIS — R00.2 HEART PALPITATIONS: ICD-10-CM

## 2018-08-30 PROBLEM — K57.30 DIVERTICULOSIS OF LARGE INTESTINE WITHOUT HEMORRHAGE: Status: RESOLVED | Noted: 2018-08-22 | Resolved: 2018-08-30

## 2018-08-30 PROCEDURE — 99214 OFFICE O/P EST MOD 30 MIN: CPT | Performed by: COLON & RECTAL SURGERY

## 2018-08-30 RX ORDER — AMOXICILLIN AND CLAVULANATE POTASSIUM 875; 125 MG/1; MG/1
1 TABLET, FILM COATED ORAL 2 TIMES DAILY
Qty: 28 TABLET | Refills: 0 | Status: SHIPPED | OUTPATIENT
Start: 2018-08-30 | End: 2018-09-13

## 2018-08-30 RX ORDER — FLUCONAZOLE 200 MG/1
200 TABLET ORAL DAILY
Qty: 14 TABLET | Refills: 0 | Status: SHIPPED | OUTPATIENT
Start: 2018-08-30 | End: 2019-02-16 | Stop reason: ALTCHOICE

## 2018-08-30 NOTE — PROGRESS NOTES
Follow Up Visit Note       Active Problems      1. Diverticulitis of large intestine without perforation or abscess without bleeding    2. Family history of colon cancer    3. Lower abdominal pain    4. History of colon resection    5.  Heart palpitations for stool softening purposes. The patient has noted some heart palpitations recently.   She states she has had these in the past, but it has been more noticeable to her in the last couple of weeks regarding she is wondering whether her antibiotics coul Location: [address] [place] [address]  8/3/2015: INJ PARAVERT F JNT L/S 1 LEV N/A      Comment: Procedure: FACET INJECTION UNDER FLUOROSCOPY;                Surgeon: Rebecca Uriarte DO;  Location: [address] [place] [address]  9/14/2015: INJ PARA children:               Social History Main Topics    Smoking status: Former Smoker                                                                Packs/day: 0.00      Years: 15.00          Types: Cigarettes       Quit date: 7/10/1992    Smokeless tobacco: Respiratory: Negative for apnea, cough, shortness of breath and wheezing. Cardiovascular: Negative for chest pain, palpitations and leg swelling. Gastrointestinal: Positive for abdominal pain and constipation.  Negative for abdominal distention, anal perforation or abscess without bleeding  (primary encounter diagnosis)  Family history of colon cancer  Lower abdominal pain  History of colon resection  Heart palpitations    Plan   The patient is a pleasant 68-year-old female known to me from a history o tenderness. No rebound pain, guarding, or peritoneal signs. Active bowel sounds are present. No incisional hernias. I reviewed my assessment and plan with the patient. I also reviewed the patient's most recent CT scan.   I have independently reviewed not recall which cardiologist she was seeing. In light of her new issues with palpitations, I have recommended that the patient see a cardiologist.  I am referring her to [facility] heart. The patient's son's wedding is coming up in about 1 month.   She is

## 2018-08-30 NOTE — PATIENT INSTRUCTIONS
The patient is a pleasant 56-year-old female known to me from a history of diverticulitis of the rectosigmoid colon. She underwent a low anterior resection for diverticulitis 1 year ago.     The patient reports a new problem with abdominal pain which began the patient's most recent CT scan. I have independently reviewed these images and provided my own interpretation. I agree with the radiologist. There is significant inflammation of the transverse colon.   This is very far from the location of her previous heart.    The patient's son's wedding is coming up in about 1 month. She is very anxious about possible recurrence of her diverticulitis during that time.   I have made it clear to the patient that she may reach out to our office any time for a refill of a

## 2018-09-06 ENCOUNTER — OFFICE VISIT (OUTPATIENT)
Dept: SURGERY | Facility: CLINIC | Age: 68
End: 2018-09-06
Payer: COMMERCIAL

## 2018-09-06 VITALS
BODY MASS INDEX: 20 KG/M2 | DIASTOLIC BLOOD PRESSURE: 79 MMHG | SYSTOLIC BLOOD PRESSURE: 104 MMHG | WEIGHT: 111 LBS | TEMPERATURE: 98 F

## 2018-09-06 DIAGNOSIS — K57.32 DIVERTICULITIS OF LARGE INTESTINE WITHOUT PERFORATION OR ABSCESS WITHOUT BLEEDING: ICD-10-CM

## 2018-09-06 DIAGNOSIS — R10.30 LOWER ABDOMINAL PAIN: Primary | ICD-10-CM

## 2018-09-06 PROCEDURE — 99213 OFFICE O/P EST LOW 20 MIN: CPT | Performed by: COLON & RECTAL SURGERY

## 2018-09-06 NOTE — PROGRESS NOTES
Follow Up Visit Note       Active Problems      1. Lower abdominal pain    2.  Diverticulitis of large intestine without perforation or abscess without bleeding          Chief Complaint   Patient presents with:  Diverticulitis: EST PT - DIVERTICULITIS LUCRETIA History:  07/26/2017: COLECTOMY      Comment: 11 inches of sigmoid colon for diverticulitis  03/2012: COLONOSCOPY      Comment: wnl   2012: D & C      Comment: and Hysteroscopy  10/26/2015: Jenn Lopez NDL/CATH SPI DX/THER TQY N/A      Comment: Proc Lung Disorder Father         • Heart Disease Father    • Other Nadya Jeanmarie Maternal Grandfather      emphzema   • Cancer Paternal Grandfather    • Other [OTHER] Paternal Grandfather      brain tumor   • High Blood Pressure Brother      Social History been reviewed by me during today. Review of Systems   Constitutional: Negative for chills, diaphoresis, fatigue, fever and unexpected weight change. HENT: Negative for hearing loss, nosebleeds, sore throat and trouble swallowing.     Respiratory: Negativ she has no fever or chills. She has no nausea or vomiting. She has erratic bowel movements, but no cyndie diarrhea or constipation. She has no blood in the stool or melena. She has no mucus in the stool. She is having narrow stool.     She is taking M antibiotic course. She may call us at any time in the future for any symptoms within the abdomen that are indicative of recurrence of her diverticulitis. No orders of the defined types were placed in this encounter.       Imaging & Referrals   None

## 2018-09-06 NOTE — PATIENT INSTRUCTIONS
This patient has recurrent diverticulitis after surgery. It is in the transverse colon. We are treating her with antibiotic therapy. She had a course of Levaquin and Flagyl. This has been transitioned to Augmentin.     Since her last visit, she has no f same location of the transverse colon. Hopefully she will not get any further or future attacks of diverticulitis.     I have no further follow-up scheduled with her at this time unless she has recurrent residual symptoms after her current antibiotic cours

## 2018-09-13 DIAGNOSIS — K57.32 DIVERTICULITIS OF LARGE INTESTINE WITHOUT PERFORATION OR ABSCESS WITHOUT BLEEDING: ICD-10-CM

## 2018-09-14 ENCOUNTER — TELEPHONE (OUTPATIENT)
Dept: SURGERY | Facility: CLINIC | Age: 68
End: 2018-09-14

## 2018-09-14 RX ORDER — AMOXICILLIN AND CLAVULANATE POTASSIUM 875; 125 MG/1; MG/1
TABLET, FILM COATED ORAL
Qty: 28 TABLET | Refills: 0 | Status: SHIPPED | OUTPATIENT
Start: 2018-09-14 | End: 2019-02-16 | Stop reason: ALTCHOICE

## 2018-11-15 PROBLEM — K21.00 GASTROESOPHAGEAL REFLUX DISEASE WITH ESOPHAGITIS: Status: ACTIVE | Noted: 2018-11-15

## 2018-12-31 PROBLEM — G56.01 RIGHT CARPAL TUNNEL SYNDROME: Status: ACTIVE | Noted: 2018-12-31

## 2018-12-31 PROBLEM — G56.21 CUBITAL TUNNEL SYNDROME ON RIGHT: Status: ACTIVE | Noted: 2018-12-31

## 2019-02-16 ENCOUNTER — HOSPITAL ENCOUNTER (OUTPATIENT)
Age: 69
Discharge: HOME | End: 2019-02-16
Payer: COMMERCIAL

## 2019-02-16 VITALS
WEIGHT: 112 LBS | RESPIRATION RATE: 18 BRPM | HEART RATE: 63 BPM | BODY MASS INDEX: 20.61 KG/M2 | OXYGEN SATURATION: 100 % | HEIGHT: 62 IN | DIASTOLIC BLOOD PRESSURE: 63 MMHG | TEMPERATURE: 98 F | SYSTOLIC BLOOD PRESSURE: 127 MMHG

## 2019-02-16 DIAGNOSIS — L02.419 ABSCESS, AXILLA: Primary | ICD-10-CM

## 2019-02-16 PROCEDURE — 87205 SMEAR GRAM STAIN: CPT | Performed by: NURSE PRACTITIONER

## 2019-02-16 PROCEDURE — 87186 SC STD MICRODIL/AGAR DIL: CPT | Performed by: NURSE PRACTITIONER

## 2019-02-16 PROCEDURE — 99214 OFFICE O/P EST MOD 30 MIN: CPT

## 2019-02-16 PROCEDURE — 87147 CULTURE TYPE IMMUNOLOGIC: CPT | Performed by: NURSE PRACTITIONER

## 2019-02-16 PROCEDURE — 10060 I&D ABSCESS SIMPLE/SINGLE: CPT

## 2019-02-16 PROCEDURE — 87070 CULTURE OTHR SPECIMN AEROBIC: CPT | Performed by: NURSE PRACTITIONER

## 2019-02-16 RX ORDER — SULFAMETHOXAZOLE AND TRIMETHOPRIM 800; 160 MG/1; MG/1
1 TABLET ORAL 2 TIMES DAILY
Qty: 14 TABLET | Refills: 0 | Status: SHIPPED | OUTPATIENT
Start: 2019-02-16 | End: 2019-02-17

## 2019-02-16 NOTE — ED PROVIDER NOTES
Patient Seen in: [facility]    History   Patient presents with:  Abscess (integumentary)    Stated Complaint: painful cyst under l arm - chest pain when taking a deep breath    80-year-old female presents today with recurring ab LUMBAR SPINE  06/2015   • MUSC/TENDON REPAIR EACH; ARM/ELBOW  02/10/2012    R wrist   • OTHER Right     wrist tendon repair   • OTHER SURGICAL HISTORY  2005    neck surgery 5 yrs ago   • REMOVAL OF OVARIAN CYST(S)  2008   • ROBOT-ASSISTED LAPAROSCOPIC LOW person, place, and time. Skin: Skin is warm and dry. Time sized abscess noted to the left axilla. Mild surrounding redness and swelling noted. No discharge at this time. Nursing note and vitals reviewed.             ED Course     Labs Reviewed   AER

## 2019-02-16 NOTE — ED INITIAL ASSESSMENT (HPI)
Complains of left armpit lump for the last one week. States it comes and goes since last December. Has not been treated for it.

## 2019-02-17 RX ORDER — CEPHALEXIN 500 MG/1
500 CAPSULE ORAL 4 TIMES DAILY
Qty: 28 CAPSULE | Refills: 0 | Status: SHIPPED | OUTPATIENT
Start: 2019-02-17 | End: 2019-02-24

## 2019-02-17 NOTE — ED NOTES
Called the patient and informed her that a prescription for Keflex was electronically sent to [address] [place] [address] (pharmacy on file. Take 500mg capsule four times a day for the next seven days. Patient verbalized understanding and all questions answered.

## 2019-02-17 NOTE — ED NOTES
Patient called stating she took one dose of Bactrim yesterday and after an hour experienced feeling lightheaded, dizzy and vomited 5X and diarrhea. Patient was instructed to stop taking Bactrim. Will discuss patient with Dr Naomi Khan.

## 2019-02-18 ENCOUNTER — TELEPHONE (OUTPATIENT)
Dept: SURGERY | Facility: CLINIC | Age: 69
End: 2019-02-18

## 2019-02-19 NOTE — ED NOTES
Final aerobic culture positive. Pt prescribed Keflex. Per Dr. Daphne Dockery, no change in care needed.

## 2019-02-27 RX ORDER — CLINDAMYCIN HYDROCHLORIDE 300 MG/1
300 CAPSULE ORAL 3 TIMES DAILY
COMMUNITY
End: 2019-03-01

## 2019-02-28 NOTE — ED NOTES
Patient called inquiring about her aerobic cx which I told her was positive for MRSA. D/W Dr. Dominique Jaramillo who reviewed sensitivity and recommended clindamycin 300mg po tid x 5 days. Patient will F/U with her PCP in about a weeks time. She states lump is smaller

## 2019-03-01 ENCOUNTER — TELEPHONE (OUTPATIENT)
Dept: URGENT CARE | Age: 69
End: 2019-03-01

## 2019-03-01 ENCOUNTER — HOSPITAL ENCOUNTER (OUTPATIENT)
Age: 69
Discharge: HOME | End: 2019-03-01
Attending: FAMILY MEDICINE
Payer: COMMERCIAL

## 2019-03-01 VITALS
RESPIRATION RATE: 18 BRPM | SYSTOLIC BLOOD PRESSURE: 112 MMHG | OXYGEN SATURATION: 97 % | HEART RATE: 70 BPM | DIASTOLIC BLOOD PRESSURE: 59 MMHG | TEMPERATURE: 98 F

## 2019-03-01 DIAGNOSIS — Z22.322 MRSA (METHICILLIN RESISTANT STAPH AUREUS) CULTURE POSITIVE: ICD-10-CM

## 2019-03-01 DIAGNOSIS — T78.40XA ALLERGIC REACTION, INITIAL ENCOUNTER: Primary | ICD-10-CM

## 2019-03-01 PROCEDURE — 99214 OFFICE O/P EST MOD 30 MIN: CPT

## 2019-03-01 PROCEDURE — 99213 OFFICE O/P EST LOW 20 MIN: CPT

## 2019-03-01 RX ORDER — PREDNISONE 20 MG/1
20 TABLET ORAL DAILY
Qty: 5 TABLET | Refills: 0 | Status: SHIPPED | OUTPATIENT
Start: 2019-03-01 | End: 2019-03-06

## 2019-03-01 RX ORDER — DOXYCYCLINE 100 MG/1
100 CAPSULE ORAL 2 TIMES DAILY
Qty: 20 CAPSULE | Refills: 0 | Status: SHIPPED | OUTPATIENT
Start: 2019-03-01 | End: 2019-03-11

## 2019-03-01 RX ORDER — CLINDAMYCIN HYDROCHLORIDE 300 MG/1
300 CAPSULE ORAL 3 TIMES DAILY
Qty: 21 CAPSULE | Refills: 0 | Status: SHIPPED | OUTPATIENT
Start: 2019-03-01 | End: 2019-03-01 | Stop reason: CLARIF

## 2019-03-01 RX ORDER — CETIRIZINE HYDROCHLORIDE 10 MG/1
10 TABLET ORAL DAILY
Qty: 5 TABLET | Refills: 0 | Status: SHIPPED | OUTPATIENT
Start: 2019-03-01 | End: 2019-03-06

## 2019-03-01 NOTE — ED NOTES
Called to patient back re Dr Valles Pain response and recommendations. Patient verbalized understanding and agreeable.

## 2019-03-01 NOTE — ED NOTES
Patient called stating started on Clindamycin 2/27/2019 had 2 doses, 3 doses yesterday and once today. This morning started with itchy rashes to arms, back and back of the waist line, face and shoulder red/pinkish. On and off chest discomfort/pressure started last night. Denies short of breath and nor difficulty breathing. Please review Dr. Ang Davidson.

## 2019-03-01 NOTE — ED PROVIDER NOTES
Patient Seen in: [facility]    History   Patient presents with:  Rash Skin Problem (integumentary)  Chest Pressure    Stated Complaint: RXN TO MEDS    HPI    73-year-old female returns the IC secondary to allergic reaction.   Moon Edmondson bowel habits    • Night sweats    • Pneumonia, organism unspecified(486)    • Restless leg syndrome    • Rosacea    • Sleep disturbance    • Stress    • Visual impairment     glasses   • Wears glasses        Past Surgical History:   Procedure Laterality Da 112/59   Pulse 70   Resp 18   Temp 97.9 °F (36.6 °C)   Temp src Oral   SpO2 97 %   O2 Device None (Room air)       Current:/59   Pulse 70   Temp 97.9 °F (36.6 °C) (Oral)   Resp 18   LMP 07/01/2016   SpO2 97%         Physical Exam    General: Pleasant Dawit [address] [address] [id number]  [phone]    Go on 3/7/2019          Medications Prescribed:  Current Discharge Medication List    START taking these medications    predniSONE 20 MG Oral Tab  Take 1 tablet (20 mg total) by mouth daily for 5 days.   Qty: 5 tablet Refil

## 2019-03-01 NOTE — ED INITIAL ASSESSMENT (HPI)
C/o itchy rashes to arms, back, shoulder, back waistline and chest started this morning. Started on Clindamycin on Wednesday for MRSA. Denies short of breath.   Also with on and off chest pressure/achy started last night

## 2021-05-06 ENCOUNTER — TELEPHONE (OUTPATIENT)
Dept: SURGERY | Facility: CLINIC | Age: 71
End: 2021-05-06

## 2021-05-06 RX ORDER — METRONIDAZOLE 250 MG/1
250 TABLET ORAL 3 TIMES DAILY
Qty: 30 TABLET | Refills: 0 | Status: SHIPPED | OUTPATIENT
Start: 2021-05-06 | End: 2021-05-16

## 2021-05-06 RX ORDER — LEVOFLOXACIN 500 MG/1
500 TABLET, FILM COATED ORAL DAILY
Qty: 10 TABLET | Refills: 0 | Status: SHIPPED | OUTPATIENT
Start: 2021-05-06 | End: 2021-05-16

## 2021-05-06 NOTE — TELEPHONE ENCOUNTER
Pt states is going out of town and is having some abdominal pain. She is worried about a flare up. After talking with DJP, ordered medications that were requested.      Requested Prescriptions     Signed Prescriptions Disp Refills   • metRONIDAZOLE (FLAGYL)

## 2021-05-17 ENCOUNTER — TELEPHONE (OUTPATIENT)
Dept: SURGERY | Facility: CLINIC | Age: 71
End: 2021-05-17

## 2021-05-17 RX ORDER — ONDANSETRON 4 MG/1
4 TABLET, FILM COATED ORAL EVERY 8 HOURS PRN
Qty: 12 TABLET | Refills: 0 | Status: SHIPPED | OUTPATIENT
Start: 2021-05-17

## 2021-05-17 RX ORDER — LEVOFLOXACIN 500 MG/1
500 TABLET, FILM COATED ORAL DAILY
Qty: 7 TABLET | Refills: 0 | Status: SHIPPED | OUTPATIENT
Start: 2021-05-17 | End: 2021-05-24

## 2021-05-17 RX ORDER — METRONIDAZOLE 250 MG/1
250 TABLET ORAL 3 TIMES DAILY
Qty: 21 TABLET | Refills: 0 | Status: SHIPPED | OUTPATIENT
Start: 2021-05-17 | End: 2021-05-24

## 2021-05-17 NOTE — TELEPHONE ENCOUNTER
Refill antibiotics for another 7 days per [facility]. Order placed. Called patient. She also reports nausea with antibiotic use. Page sent to ryan LEE for Zofran script. [id number] for Lizzie Trejo. Orders sent. Patient aware.      Requested Prescriptions     Signed Prescr

## 2021-05-17 NOTE — TELEPHONE ENCOUNTER
Patient called office and states she was given levaquin and flagyl for divertic flair. states finished meds this AM, pain is much better, but not completely gone.  Is wondering if needs another round of meds or to come in for appointment. msg sent to ryan COSTA

## 2021-05-21 ENCOUNTER — TELEPHONE (OUTPATIENT)
Dept: SURGERY | Facility: CLINIC | Age: 71
End: 2021-05-21

## 2021-05-21 RX ORDER — FLUCONAZOLE 200 MG/1
200 TABLET ORAL DAILY
Qty: 14 TABLET | Refills: 0 | Status: SHIPPED | OUTPATIENT
Start: 2021-05-21 | End: 2021-06-04

## 2021-05-21 NOTE — TELEPHONE ENCOUNTER
Pt on antibiotic for diverticulitis and feels she is getting thrush again. Asking for medication.  Order placed

## 2022-02-09 ENCOUNTER — HOSPITAL ENCOUNTER (OUTPATIENT)
Dept: MRI IMAGING | Age: 72
Discharge: HOME | End: 2022-02-09
Attending: INTERNAL MEDICINE
Payer: COMMERCIAL

## 2022-02-09 DIAGNOSIS — K86.9 LESION OF PANCREAS: ICD-10-CM

## 2022-02-09 PROCEDURE — A9575 INJ GADOTERATE MEGLUMI 0.1ML: HCPCS | Performed by: INTERNAL MEDICINE

## 2022-02-09 PROCEDURE — 74183 MRI ABD W/O CNTR FLWD CNTR: CPT | Performed by: INTERNAL MEDICINE

## 2022-02-09 PROCEDURE — 76376 3D RENDER W/INTRP POSTPROCES: CPT | Performed by: INTERNAL MEDICINE

## 2022-02-24 PROBLEM — F41.9 ANXIETY: Status: ACTIVE | Noted: 2022-02-24

## 2022-02-24 PROBLEM — G25.81 RLS (RESTLESS LEGS SYNDROME): Status: ACTIVE | Noted: 2022-02-24

## 2022-02-24 PROBLEM — M85.80 OSTEOPENIA, UNSPECIFIED LOCATION: Status: ACTIVE | Noted: 2022-02-24

## 2022-10-06 ENCOUNTER — OFFICE VISIT (OUTPATIENT)
Facility: LOCATION | Age: 72
End: 2022-10-06
Payer: COMMERCIAL

## 2022-10-06 DIAGNOSIS — Z80.0 FAMILY HISTORY OF COLON CANCER: Primary | ICD-10-CM

## 2022-10-06 PROCEDURE — 99203 OFFICE O/P NEW LOW 30 MIN: CPT | Performed by: COLON & RECTAL SURGERY

## 2022-10-06 RX ORDER — POLYETHYLENE GLYCOL 3350, SODIUM CHLORIDE, SODIUM BICARBONATE, POTASSIUM CHLORIDE 420; 11.2; 5.72; 1.48 G/4L; G/4L; G/4L; G/4L
POWDER, FOR SOLUTION ORAL
Qty: 1 EACH | Refills: 0 | Status: SHIPPED | OUTPATIENT
Start: 2022-10-06

## 2022-10-06 NOTE — PATIENT INSTRUCTIONS
The patient presents today in consultation for a colonoscopy. The patient has  had a prior colonoscopy. I performed this patient's last colonoscopy in 2017. She has never had a colon polyp. The patient denies diarrhea, constipation or alternating between the two. The patient denies having any blood, mucus or dark tarry stools. The patient denies having any narrowing of stools. The patient denies any unintentional weight loss. The patient denies any abdominal pain. She does have intermittent abdominal distention the patient denies fevers, chills, nausea or vomiting. The patient's mother had colon cancer at the age of 80 and her maternal grandmother also has a history of colon cancer. The patient denies any first or second-degree relatives with a history of colon polyps. The patient denies any first or second-degree family history of uterine cancer. The patient has a past medical history significant for diverticulitis. She does not take any blood thinners. The patient has a low anterior resection of the rectosigmoid colon for diverticulitis with me on 7/26/2017. Clinical examination of the abdomen reveals it to be soft, nondistended, nontender, bowel sounds are normal activity normal pitch. There  is no rebounding tenderness or guarding. There are no signs of ascites or peritonitis. The liver and spleen are nonpalpable. There are no palpable masses or hernias. The patient has well-healed laparoscopic and Pfannenstiel incisions. The patient will be scheduled to undergo a colonoscopy on 10/27/2022 at [facility] in [facility]. All risks, benefits, complications and alternatives to the proposed procedure(s) were fully discussed with the patient. All questions from the patient were answered in detail. A description of the procedure(s) possible outcomes was fully discussed. The patient seemed to understand the conversation and its details.     Consent for the procedure(s) was confirmed with the patient.

## 2022-10-24 ENCOUNTER — TELEPHONE (OUTPATIENT)
Facility: LOCATION | Age: 72
End: 2022-10-24

## 2022-10-25 RX ORDER — ATORVASTATIN CALCIUM 20 MG/1
20 TABLET, FILM COATED ORAL NIGHTLY
COMMUNITY

## 2022-10-25 RX ORDER — ROPINIROLE 1 MG/1
1 TABLET, FILM COATED ORAL 3 TIMES DAILY PRN
COMMUNITY

## 2022-10-27 ENCOUNTER — HOSPITAL ENCOUNTER (OUTPATIENT)
Facility: HOSPITAL | Age: 72
Setting detail: HOSPITAL OUTPATIENT SURGERY
Discharge: HOME | End: 2022-10-27
Attending: COLON & RECTAL SURGERY | Admitting: COLON & RECTAL SURGERY
Payer: MEDICARE

## 2022-10-27 ENCOUNTER — ANESTHESIA EVENT (OUTPATIENT)
Dept: ENDOSCOPY | Facility: HOSPITAL | Age: 72
End: 2022-10-27
Payer: MEDICARE

## 2022-10-27 ENCOUNTER — ANESTHESIA (OUTPATIENT)
Dept: ENDOSCOPY | Facility: HOSPITAL | Age: 72
End: 2022-10-27
Payer: MEDICARE

## 2022-10-27 VITALS
WEIGHT: 126 LBS | HEART RATE: 60 BPM | SYSTOLIC BLOOD PRESSURE: 107 MMHG | HEIGHT: 62 IN | DIASTOLIC BLOOD PRESSURE: 86 MMHG | OXYGEN SATURATION: 97 % | RESPIRATION RATE: 16 BRPM | TEMPERATURE: 98 F | BODY MASS INDEX: 23.19 KG/M2

## 2022-10-27 DIAGNOSIS — Z80.0 FAMILY HISTORY OF COLON CANCER: ICD-10-CM

## 2022-10-27 PROCEDURE — 0DJD8ZZ INSPECTION OF LOWER INTESTINAL TRACT, VIA NATURAL OR ARTIFICIAL OPENING ENDOSCOPIC: ICD-10-PCS | Performed by: COLON & RECTAL SURGERY

## 2022-10-27 RX ORDER — SODIUM CHLORIDE, SODIUM LACTATE, POTASSIUM CHLORIDE, CALCIUM CHLORIDE 600; 310; 30; 20 MG/100ML; MG/100ML; MG/100ML; MG/100ML
INJECTION, SOLUTION INTRAVENOUS CONTINUOUS
Status: DISCONTINUED | OUTPATIENT
Start: 2022-10-27 | End: 2022-10-27

## 2022-10-27 RX ORDER — NALOXONE HYDROCHLORIDE 0.4 MG/ML
80 INJECTION, SOLUTION INTRAMUSCULAR; INTRAVENOUS; SUBCUTANEOUS AS NEEDED
Status: DISCONTINUED | OUTPATIENT
Start: 2022-10-27 | End: 2022-10-27

## 2022-10-27 RX ORDER — LIDOCAINE HYDROCHLORIDE 10 MG/ML
INJECTION, SOLUTION EPIDURAL; INFILTRATION; INTRACAUDAL; PERINEURAL AS NEEDED
Status: DISCONTINUED | OUTPATIENT
Start: 2022-10-27 | End: 2022-10-27 | Stop reason: SURG

## 2022-10-27 RX ADMIN — LIDOCAINE HYDROCHLORIDE 10 MG: 10 INJECTION, SOLUTION EPIDURAL; INFILTRATION; INTRACAUDAL; PERINEURAL at 11:04:00

## 2022-10-27 NOTE — ANESTHESIA POSTPROCEDURE EVALUATION
[facility] Patient Status:  Hospital Outpatient Surgery   Age/Gender 81year old female MRN [id number]   Location [address] [address] Attending Arik Walker MD   [address] 0 PCP Esteban Solano MD       Anesthesia Post-op Note    COLONOSCOPY    Procedure Summary     Date: 10/27/22 Room / Location: [facility] ENDOSCOPY 04 / [facility] ENDOSCOPY    Anesthesia Start: 4673 Anesthesia Stop: 1127    Procedure: COLONOSCOPY Diagnosis:       Family history of colon cancer      (internal and external prolapsing hemorrhoids, diverticulosis)    Surgeons: Arik Walker MD Anesthesiologist: Anthony Fraire MD    Anesthesia Type: MAC ASA Status: 2          Anesthesia Type: MAC    Vitals Value Taken Time   /75 10/27/22 1127   Temp 98 10/27/22 1127   Pulse 80 10/27/22 1127   Resp 14 10/27/22 1127   SpO2 97 10/27/22 1127       Patient Location: Endoscopy    Anesthesia Type: MAC    Airway Patency: patent    Postop Pain Control: adequate    Mental Status: mildly sedated but able to meaningfully participate in the post-anesthesia evaluation    Nausea/Vomiting: none    Cardiopulmonary/Hydration status: stable euvolemic    Complications: no apparent anesthesia related complications    Postop vital signs: stable    Dental Exam: Unchanged from Preop    Patient to be discharged home when criteria met.

## 2022-10-27 NOTE — DISCHARGE INSTRUCTIONS
Home Care Instructions for Colonoscopy with Sedation    Diet:  - Resume your regular diet as tolerated unless otherwise instructed. - Start with light meals to minimize bloating.  - Do not drink alcohol today. Medication:  - If you have questions about resuming your normal medications, please contact your Primary Care Physician. Activities:  - Take it easy today. Do not return to work today. - Do not drive today. - Do not operate any machinery today (including kitchen equipment).     Colonoscopy:  - You may notice some rectal \"spotting\" (a little blood on the toilet tissue) for a day or two after the exam. This is normal.  - If you experience any rectal bleeding (not spotting), persistent tenderness or sharp severe abdominal pains, oral temperature over 100 degrees Fahrenheit, light-headedness or dizziness, or any other problems, contact your docto

## 2022-10-27 NOTE — INTERVAL H&P NOTE
Pre-op Diagnosis: Family history of colon cancer [Z80.0]    The above referenced H&P was reviewed by Shelby Lopez MD on 10/27/2022, the patient was examined and no significant changes have occurred in the patient's condition since the H&P was performed. I discussed with the patient and/or legal representative the potential benefits, risks and side effects of this procedure; the likelihood of the patient achieving goals; and potential problems that might occur during recuperation. I discussed reasonable alternatives to the procedure, including risks, benefits and side effects related to the alternatives and risks related to not receiving this procedure. We will proceed with procedure as planned.

## 2023-02-27 NOTE — PLAN OF CARE
GASTROINTESTINAL - ADULT    • Minimal or absence of nausea and vomiting Progressing        PAIN - ADULT    • Verbalizes/displays adequate comfort level or patient's stated pain goal Progressing        Patient/Family Goals    • Patient/Family Long Term Goal 98.5

## 2023-06-20 ENCOUNTER — APPOINTMENT (OUTPATIENT)
Dept: URBAN - METROPOLITAN AREA CLINIC 242 | Age: 73
Setting detail: DERMATOLOGY
End: 2023-06-20

## 2023-06-20 DIAGNOSIS — L81.4 OTHER MELANIN HYPERPIGMENTATION: ICD-10-CM

## 2023-06-20 DIAGNOSIS — D22 MELANOCYTIC NEVI: ICD-10-CM

## 2023-06-20 DIAGNOSIS — L82.1 OTHER SEBORRHEIC KERATOSIS: ICD-10-CM

## 2023-06-20 DIAGNOSIS — D18.0 HEMANGIOMA: ICD-10-CM

## 2023-06-20 DIAGNOSIS — L72.0 EPIDERMAL CYST: ICD-10-CM

## 2023-06-20 PROBLEM — D22.4 MELANOCYTIC NEVI OF SCALP AND NECK: Status: ACTIVE | Noted: 2023-06-20

## 2023-06-20 PROBLEM — D18.01 HEMANGIOMA OF SKIN AND SUBCUTANEOUS TISSUE: Status: ACTIVE | Noted: 2023-06-20

## 2023-06-20 PROCEDURE — OTHER DIAGNOSIS COMMENT: OTHER

## 2023-06-20 PROCEDURE — OTHER INCISION AND DRAINAGE: OTHER

## 2023-06-20 PROCEDURE — 10060 I&D ABSCESS SIMPLE/SINGLE: CPT

## 2023-06-20 PROCEDURE — 99213 OFFICE O/P EST LOW 20 MIN: CPT | Mod: 25

## 2023-06-20 PROCEDURE — OTHER COUNSELING: OTHER

## 2023-06-20 ASSESSMENT — LOCATION DETAILED DESCRIPTION DERM
LOCATION DETAILED: RIGHT ANTERIOR DISTAL THIGH
LOCATION DETAILED: LEFT MEDIAL UPPER BACK
LOCATION DETAILED: LEFT ANTERIOR PROXIMAL THIGH
LOCATION DETAILED: LEFT INFERIOR ANTERIOR NECK
LOCATION DETAILED: LEFT PROXIMAL DORSAL FOREARM
LOCATION DETAILED: RIGHT DISTAL POSTERIOR THIGH
LOCATION DETAILED: UPPER STERNUM
LOCATION DETAILED: RIGHT MEDIAL EYEBROW
LOCATION DETAILED: LEFT SUPERIOR MEDIAL UPPER BACK
LOCATION DETAILED: EPIGASTRIC SKIN
LOCATION DETAILED: LEFT SUPERIOR UPPER BACK
LOCATION DETAILED: RIGHT ANTERIOR EARLOBE
LOCATION DETAILED: RIGHT SUPERIOR LATERAL UPPER BACK
LOCATION DETAILED: RIGHT PROXIMAL DORSAL FOREARM

## 2023-06-20 ASSESSMENT — LOCATION SIMPLE DESCRIPTION DERM
LOCATION SIMPLE: RIGHT BACK
LOCATION SIMPLE: RIGHT POSTERIOR THIGH
LOCATION SIMPLE: LEFT THIGH
LOCATION SIMPLE: RIGHT THIGH
LOCATION SIMPLE: CHEST
LOCATION SIMPLE: RIGHT FOREARM
LOCATION SIMPLE: ABDOMEN
LOCATION SIMPLE: RIGHT EYEBROW
LOCATION SIMPLE: LEFT FOREARM
LOCATION SIMPLE: RIGHT EAR
LOCATION SIMPLE: LEFT UPPER BACK
LOCATION SIMPLE: LEFT ANTERIOR NECK

## 2023-06-20 ASSESSMENT — LOCATION ZONE DERM
LOCATION ZONE: LEG
LOCATION ZONE: EAR
LOCATION ZONE: TRUNK
LOCATION ZONE: NECK
LOCATION ZONE: ARM
LOCATION ZONE: FACE

## 2023-06-20 NOTE — PROCEDURE: DIAGNOSIS COMMENT
Comment: Discussed I&D vs punch removal, pt opted for I&D today
Detail Level: Simple
Render Risk Assessment In Note?: no

## 2023-06-20 NOTE — PROCEDURE: INCISION AND DRAINAGE
Method: 11 blade
Consent was obtained and risks were reviewed including but not limited to delayed wound healing, infection, need for multiple I and D's, and pain.
Dressing: dry sterile dressing
Post-Care Instructions: I reviewed with the patient in detail post-care instructions. Patient should keep wound covered and call the office should any redness, pain, swelling or worsening occur.
Preparation Text: The area was prepped in the usual clean fashion.
Anesthesia Type: 1% lidocaine with epinephrine
Render Postcare In Note?: No
Suture Text: The incision was partially closed with
Wound Care: Petrolatum
Epidermal Sutures: 4-0 Ethilon
Size Of Lesion In Cm (Optional But May Be Required For Some Insurances): 0
Anesthesia Volume In Cc: 1
Lesion Type: Abscess
Curette Text (Optional): After the contents were expressed a curette was used to partially remove the cyst wall.
Epidermal Closure: simple interrupted
Detail Level: Simple

## 2023-08-09 ENCOUNTER — APPOINTMENT (OUTPATIENT)
Dept: URBAN - METROPOLITAN AREA CLINIC 242 | Age: 73
Setting detail: DERMATOLOGY
End: 2023-08-09

## 2023-08-09 DIAGNOSIS — L0391 CELLULITIS AND ABSCESS OF UNSPECIFIED SITES: ICD-10-CM

## 2023-08-09 DIAGNOSIS — L0390 CELLULITIS AND ABSCESS OF UNSPECIFIED SITES: ICD-10-CM

## 2023-08-09 PROBLEM — L02.01 CUTANEOUS ABSCESS OF FACE: Status: ACTIVE | Noted: 2023-08-09

## 2023-08-09 PROCEDURE — OTHER PRESCRIPTION: OTHER

## 2023-08-09 PROCEDURE — 99213 OFFICE O/P EST LOW 20 MIN: CPT

## 2023-08-09 PROCEDURE — OTHER COUNSELING: OTHER

## 2023-08-09 RX ORDER — CEPHALEXIN 500 MG/1
CAPSULE ORAL
Qty: 30 | Refills: 0 | Status: ERX | COMMUNITY
Start: 2023-08-09

## 2023-08-09 ASSESSMENT — LOCATION DETAILED DESCRIPTION DERM: LOCATION DETAILED: LEFT CENTRAL MALAR CHEEK

## 2023-08-09 ASSESSMENT — LOCATION SIMPLE DESCRIPTION DERM: LOCATION SIMPLE: LEFT CHEEK

## 2023-08-09 ASSESSMENT — LOCATION ZONE DERM: LOCATION ZONE: FACE

## 2024-04-22 ENCOUNTER — APPOINTMENT (OUTPATIENT)
Dept: URBAN - METROPOLITAN AREA CLINIC 242 | Age: 74
Setting detail: DERMATOLOGY
End: 2024-04-22

## 2024-04-22 DIAGNOSIS — D22 MELANOCYTIC NEVI: ICD-10-CM

## 2024-04-22 DIAGNOSIS — L82.0 INFLAMED SEBORRHEIC KERATOSIS: ICD-10-CM

## 2024-04-22 DIAGNOSIS — L82.1 OTHER SEBORRHEIC KERATOSIS: ICD-10-CM

## 2024-04-22 PROBLEM — D22.4 MELANOCYTIC NEVI OF SCALP AND NECK: Status: ACTIVE | Noted: 2024-04-22

## 2024-04-22 PROCEDURE — 17110 DESTRUCT B9 LESION 1-14: CPT

## 2024-04-22 PROCEDURE — OTHER MIPS QUALITY: OTHER

## 2024-04-22 PROCEDURE — 99213 OFFICE O/P EST LOW 20 MIN: CPT | Mod: 25

## 2024-04-22 PROCEDURE — OTHER LIQUID NITROGEN: OTHER

## 2024-04-22 PROCEDURE — OTHER COUNSELING: OTHER

## 2024-04-22 ASSESSMENT — LOCATION SIMPLE DESCRIPTION DERM
LOCATION SIMPLE: LEFT ANTERIOR NECK
LOCATION SIMPLE: NECK

## 2024-04-22 ASSESSMENT — LOCATION DETAILED DESCRIPTION DERM
LOCATION DETAILED: LEFT INFERIOR LATERAL NECK
LOCATION DETAILED: RIGHT CENTRAL LATERAL NECK
LOCATION DETAILED: LEFT INFERIOR ANTERIOR NECK

## 2024-04-22 ASSESSMENT — LOCATION ZONE DERM: LOCATION ZONE: NECK

## 2024-04-22 NOTE — PROCEDURE: LIQUID NITROGEN
Duration Of Freeze Thaw-Cycle (Seconds): 15-20
Render Note In Bullet Format When Appropriate: No
Detail Level: Detailed
Medical Necessity Information: It is in your best interest to select a reason for this procedure from the list below. All of these items fulfill various CMS LCD requirements except the new and changing color options.
Post-Care Instructions: I reviewed with the patient in detail post-care instructions. Patient is to wear sunprotection, and avoid picking at any of the treated lesions. Pt may apply Vaseline to crusted or scabbing areas.
Show Spray Paint Technique Variable?: Yes
Consent: The patient's consent was obtained including but not limited to risks of crusting, scabbing, blistering, scarring, darker or lighter pigmentary change, recurrence, incomplete removal and infection.
Spray Paint Text: The liquid nitrogen was applied to the skin utilizing a spray paint frosting technique.
Number Of Freeze-Thaw Cycles: 3 freeze-thaw cycles
Medical Necessity Clause: This procedure was medically necessary because the lesions that were treated were:
Application Tool (Optional): Liquid Nitrogen Sprayer

## 2024-12-05 ENCOUNTER — LAB ENCOUNTER (OUTPATIENT)
Dept: LAB | Age: 74
End: 2024-12-05
Payer: MEDICARE

## 2024-12-05 DIAGNOSIS — R74.8 ELEVATED LIVER ENZYMES: ICD-10-CM

## 2024-12-05 PROBLEM — M26.639 ARTICULAR DISC DISORDER OF TEMPOROMANDIBULAR JOINT: Status: ACTIVE | Noted: 2019-03-01

## 2024-12-05 PROBLEM — A49.02 METHICILLIN RESISTANT STAPHYLOCOCCUS AUREUS INFECTION: Status: ACTIVE | Noted: 2019-03-06

## 2024-12-05 PROBLEM — F33.0 MDD (MAJOR DEPRESSIVE DISORDER), RECURRENT EPISODE, MILD (HCC): Status: ACTIVE | Noted: 2024-12-05

## 2024-12-05 PROBLEM — C44.91 BASAL CELL CARCINOMA OF SKIN: Status: ACTIVE | Noted: 2020-09-03

## 2024-12-05 PROBLEM — K57.30 DIVERTICULAR DISEASE OF LARGE INTESTINE WITH COMPLICATION: Status: ACTIVE | Noted: 2019-03-01

## 2024-12-05 PROBLEM — F41.1 GAD (GENERALIZED ANXIETY DISORDER): Status: ACTIVE | Noted: 2024-12-05

## 2024-12-05 PROBLEM — G47.00 INSOMNIA: Status: ACTIVE | Noted: 2019-03-01

## 2024-12-05 PROBLEM — I70.0 AORTIC ATHEROSCLEROSIS: Status: ACTIVE | Noted: 2023-11-07

## 2024-12-05 PROBLEM — E46 PROTEIN-CALORIE MALNUTRITION (HCC): Status: ACTIVE | Noted: 2024-11-20

## 2024-12-05 PROBLEM — H66.91 OTITIS MEDIA OF RIGHT EAR: Status: ACTIVE | Noted: 2019-03-01

## 2024-12-05 PROBLEM — K57.92 DIVERTICULITIS: Status: ACTIVE | Noted: 2019-03-01

## 2024-12-05 PROBLEM — F33.0 MDD (MAJOR DEPRESSIVE DISORDER), RECURRENT EPISODE, MILD: Status: ACTIVE | Noted: 2024-12-05

## 2024-12-05 PROBLEM — G25.9 MOVEMENT DISORDER: Status: ACTIVE | Noted: 2019-03-01

## 2024-12-05 PROBLEM — I70.0 AORTIC ATHEROSCLEROSIS (HCC): Status: ACTIVE | Noted: 2023-11-07

## 2024-12-05 LAB
CERULOPLASMIN SERPL-MCNC: 30 MG/DL (ref 20–60)
DEPRECATED HBV CORE AB SER IA-ACNC: 41 NG/ML
HAV AB SER QL IA: NONREACTIVE
HBV CORE AB SERPL QL IA: NONREACTIVE
HBV SURFACE AB SER QL: NONREACTIVE
HBV SURFACE AB SERPL IA-ACNC: <3.1 MIU/ML
HBV SURFACE AG SER-ACNC: <0.1 [IU]/L
HBV SURFACE AG SERPL QL IA: NONREACTIVE
HCV AB SERPL QL IA: NONREACTIVE
IRON SATN MFR SERPL: 26 %
IRON SERPL-MCNC: 94 UG/DL
TOTAL IRON BINDING CAPACITY: 365 UG/DL (ref 250–425)
TRANSFERRIN SERPL-MCNC: 297 MG/DL (ref 250–380)

## 2024-12-05 PROCEDURE — 86708 HEPATITIS A ANTIBODY: CPT

## 2024-12-05 PROCEDURE — 82103 ALPHA-1-ANTITRYPSIN TOTAL: CPT

## 2024-12-05 PROCEDURE — 83550 IRON BINDING TEST: CPT

## 2024-12-05 PROCEDURE — 87340 HEPATITIS B SURFACE AG IA: CPT

## 2024-12-05 PROCEDURE — 82728 ASSAY OF FERRITIN: CPT

## 2024-12-05 PROCEDURE — 86803 HEPATITIS C AB TEST: CPT

## 2024-12-05 PROCEDURE — 86704 HEP B CORE ANTIBODY TOTAL: CPT

## 2024-12-05 PROCEDURE — 82390 ASSAY OF CERULOPLASMIN: CPT

## 2024-12-05 PROCEDURE — 86706 HEP B SURFACE ANTIBODY: CPT

## 2024-12-05 PROCEDURE — 86038 ANTINUCLEAR ANTIBODIES: CPT

## 2024-12-05 PROCEDURE — 86039 ANTINUCLEAR ANTIBODIES (ANA): CPT

## 2024-12-05 PROCEDURE — 83516 IMMUNOASSAY NONANTIBODY: CPT

## 2024-12-05 PROCEDURE — 36415 COLL VENOUS BLD VENIPUNCTURE: CPT

## 2024-12-05 PROCEDURE — 83540 ASSAY OF IRON: CPT

## 2024-12-06 LAB
A-1-ANTITRYPSIN: 137 MG/DL
ACTIN SMOOTH MUSCLE AB: 5 UNITS
M2 MITOCHONDRIAL AB: <20 UNITS

## 2024-12-09 LAB — NUCLEAR IGG TITR SER IF: POSITIVE {TITER}

## 2024-12-10 LAB — ANA NUCLEOLAR TITR SER IF: 160 {TITER}

## 2024-12-10 RX ORDER — FERROUS SULFATE 325(65) MG
325 TABLET, DELAYED RELEASE (ENTERIC COATED) ORAL
Qty: 90 TABLET | Refills: 1 | Status: SHIPPED | OUTPATIENT
Start: 2024-12-10

## 2024-12-12 ENCOUNTER — HOSPITAL ENCOUNTER (OUTPATIENT)
Dept: MRI IMAGING | Facility: HOSPITAL | Age: 74
Discharge: HOME | End: 2024-12-12
Payer: MEDICARE

## 2024-12-12 DIAGNOSIS — R10.13 EPIGASTRIC PAIN: ICD-10-CM

## 2024-12-12 DIAGNOSIS — R63.4 WEIGHT LOSS: ICD-10-CM

## 2024-12-12 DIAGNOSIS — R74.8 ELEVATED LIVER ENZYMES: ICD-10-CM

## 2024-12-12 DIAGNOSIS — K86.2 PANCREATIC CYST (HCC): ICD-10-CM

## 2024-12-12 PROCEDURE — 74183 MRI ABD W/O CNTR FLWD CNTR: CPT

## 2024-12-12 PROCEDURE — A9575 INJ GADOTERATE MEGLUMI 0.1ML: HCPCS

## 2024-12-12 PROCEDURE — 76376 3D RENDER W/INTRP POSTPROCES: CPT

## 2024-12-12 RX ORDER — DIPHENHYDRAMINE HYDROCHLORIDE 50 MG/ML
10 INJECTION, SOLUTION INTRAMUSCULAR; INTRAVENOUS
Status: COMPLETED | OUTPATIENT
Start: 2024-12-12 | End: 2024-12-12

## 2024-12-12 RX ADMIN — DIPHENHYDRAMINE HYDROCHLORIDE 10 ML: 50 INJECTION, SOLUTION INTRAMUSCULAR; INTRAVENOUS at 21:17:00

## 2024-12-30 RX ORDER — PANTOPRAZOLE SODIUM 40 MG/1
40 TABLET, DELAYED RELEASE ORAL
COMMUNITY

## 2025-01-14 ENCOUNTER — HOSPITAL ENCOUNTER (OUTPATIENT)
Facility: HOSPITAL | Age: 75
Setting detail: HOSPITAL OUTPATIENT SURGERY
Discharge: HOME | End: 2025-01-14
Attending: INTERNAL MEDICINE | Admitting: INTERNAL MEDICINE
Payer: MEDICARE

## 2025-01-14 ENCOUNTER — ANESTHESIA EVENT (OUTPATIENT)
Dept: ENDOSCOPY | Facility: HOSPITAL | Age: 75
End: 2025-01-14
Payer: MEDICARE

## 2025-01-14 ENCOUNTER — ANESTHESIA (OUTPATIENT)
Dept: ENDOSCOPY | Facility: HOSPITAL | Age: 75
End: 2025-01-14
Payer: MEDICARE

## 2025-01-14 VITALS
WEIGHT: 101 LBS | OXYGEN SATURATION: 94 % | BODY MASS INDEX: 18.58 KG/M2 | SYSTOLIC BLOOD PRESSURE: 119 MMHG | TEMPERATURE: 98 F | DIASTOLIC BLOOD PRESSURE: 57 MMHG | HEIGHT: 62 IN | HEART RATE: 70 BPM | RESPIRATION RATE: 16 BRPM

## 2025-01-14 DIAGNOSIS — R74.8 ELEVATED LIVER ENZYMES: ICD-10-CM

## 2025-01-14 DIAGNOSIS — R10.13 RECURRENT EPIGASTRIC ABDOMINAL PAIN: ICD-10-CM

## 2025-01-14 DIAGNOSIS — R19.4 CHANGE IN BOWEL HABITS: ICD-10-CM

## 2025-01-14 DIAGNOSIS — R63.4 WEIGHT LOSS: ICD-10-CM

## 2025-01-14 DIAGNOSIS — Z80.0 FAMILY HISTORY OF COLON CANCER: ICD-10-CM

## 2025-01-14 PROCEDURE — 88305 TISSUE EXAM BY PATHOLOGIST: CPT | Performed by: INTERNAL MEDICINE

## 2025-01-14 PROCEDURE — 0DBE8ZX EXCISION OF LARGE INTESTINE, VIA NATURAL OR ARTIFICIAL OPENING ENDOSCOPIC, DIAGNOSTIC: ICD-10-PCS | Performed by: INTERNAL MEDICINE

## 2025-01-14 PROCEDURE — 0DB68ZX EXCISION OF STOMACH, VIA NATURAL OR ARTIFICIAL OPENING ENDOSCOPIC, DIAGNOSTIC: ICD-10-PCS | Performed by: INTERNAL MEDICINE

## 2025-01-14 PROCEDURE — 0DB48ZX EXCISION OF ESOPHAGOGASTRIC JUNCTION, VIA NATURAL OR ARTIFICIAL OPENING ENDOSCOPIC, DIAGNOSTIC: ICD-10-PCS | Performed by: INTERNAL MEDICINE

## 2025-01-14 PROCEDURE — BD47ZZZ ULTRASONOGRAPHY OF GASTROINTESTINAL TRACT: ICD-10-PCS | Performed by: INTERNAL MEDICINE

## 2025-01-14 PROCEDURE — 0DB98ZX EXCISION OF DUODENUM, VIA NATURAL OR ARTIFICIAL OPENING ENDOSCOPIC, DIAGNOSTIC: ICD-10-PCS | Performed by: INTERNAL MEDICINE

## 2025-01-14 PROCEDURE — 0DB58ZX EXCISION OF ESOPHAGUS, VIA NATURAL OR ARTIFICIAL OPENING ENDOSCOPIC, DIAGNOSTIC: ICD-10-PCS | Performed by: INTERNAL MEDICINE

## 2025-01-14 RX ORDER — SODIUM CHLORIDE, SODIUM LACTATE, POTASSIUM CHLORIDE, CALCIUM CHLORIDE 600; 310; 30; 20 MG/100ML; MG/100ML; MG/100ML; MG/100ML
INJECTION, SOLUTION INTRAVENOUS CONTINUOUS
Status: DISCONTINUED | OUTPATIENT
Start: 2025-01-14 | End: 2025-01-14

## 2025-01-14 RX ORDER — LIDOCAINE HYDROCHLORIDE 10 MG/ML
INJECTION, SOLUTION EPIDURAL; INFILTRATION; INTRACAUDAL; PERINEURAL AS NEEDED
Status: DISCONTINUED | OUTPATIENT
Start: 2025-01-14 | End: 2025-01-14 | Stop reason: SURG

## 2025-01-14 RX ADMIN — LIDOCAINE HYDROCHLORIDE 100 MG: 10 INJECTION, SOLUTION EPIDURAL; INFILTRATION; INTRACAUDAL; PERINEURAL at 08:27:00

## 2025-01-14 NOTE — H&P
[facility]  Pre-op H and P    Lamar Calero Patient Status:  Hospital Outpatient Surgery    1950 MRN [id number]   Location [facility] ENDOSCOPY [facility]IN [facility] Attending Josr Hughes MD   Date 2025 PCP Cecilia Anderson MD     CC:   Elevated liver enzymes   Weight loss   Recurrent epigastric abdominal pain   Change in bowel habits   Family history of colon cancer       History of Present Illness:  Lamar Calero is a a(n) 74 year old female.   Elevated liver enzymes   Weight loss   Recurrent epigastric abdominal pain   Change in bowel habits   Family history of colon cancer       History:  Past Medical History:    Abdominal hernia    Abdominal pain    Anxiety    Arthritis    Back pain    Back problem    c4-6 fusion, LESI; lumbar    Bloating    Cancer (HCC)    basal cell carcinoma    Chest pain    Colitis    Constipation    Diarrhea, unspecified    Diverticulosis of large intestine    Dizziness    Hemorrhoids    Hiatal hernia    High cholesterol    History of basal cell cancer    nose    History of basal cell carcinoma    chest    Indigestion    Irregular bowel habits    Night sweats    Osteoporosis    Pneumonia, organism unspecified(486)    Restless leg syndrome    Rosacea    Sleep disturbance    Stress    Thrush    Uncomfortable fullness after meals    Visual impairment    glasses    Wears glasses    Weight loss     Past Surgical History:   Procedure Laterality Date    Back surgery      Colectomy  2017    11 inches of sigmoid colon for diverticulitis    Colon surgery      Colonoscopy  2012    wnl     Colonoscopy      Colonoscopy N/A 10/27/2022    Procedure: COLONOSCOPY;  Surgeon: Tereso Hankins MD;  Location:  ENDOSCOPY    D & c      and Hysteroscopy    Fluor gid & loclzj ndl/cath spi dx/ther njx N/A 10/26/2015    Procedure: LUMBAR / TRANSFORAMINAL EPIDURAL STEROID INJECTION;  Surgeon: Eyad Islas DO;  Location: [facility] [place]    Inj paravert f jnt l/s 1  lev N/A 2015    Procedure: FACET INJECTION UNDER FLUOROSCOPY;  Surgeon: Eyad Islas DO;  Location: [facility]    Inj paravert f jnt l/s 1 lev Left 2015    Procedure: SELECTIVE NERVE ROOT BLOCK;  Surgeon: Eyad Islas DO;  Location: [facility]    Injection, w/wo contrast, dx/therapeutic substance, epidural/subarachnoid; lumbar/sacral N/A 10/26/2015    Procedure: LUMBAR / TRANSFORAMINAL EPIDURAL STEROID INJECTION;  Surgeon: Eyad Islas DO;  Location: [facility]    M-sedaj by  phys perfrmg svc 5+ yr N/A 2015    Procedure: FACET INJECTION UNDER FLUOROSCOPY;  Surgeon: Eyad Islas DO;  Location: [facility]    Mri, lumbar spine  2015    Musc/tendon repair each; arm/elbow  02/10/2012    R wrist    Other Right     wrist tendon repair    Other surgical history      cervical fusion x1 surgery    Removal of ovarian cyst(s)      Spine surgery procedure unlisted      Tonsillectomy       Family History   Problem Relation Age of Onset    High Blood Pressure Mother             High Cholesterol Mother     Cancer Mother         colon    Lung Disorder Mother         blood clot    Heart Disease Mother     Colon Cancer Mother     Colon Polyps Mother     Other (Other) Mother         emph    Cancer Maternal Grandmother         colon    Colon Cancer Maternal Grandmother     Diabetes Paternal Grandmother     Lung Disorder Father             Heart Disease Father     Hypertension Father     Other (Other) Maternal Grandfather         emphzema    Cancer Paternal Grandfather     Other (Other) Paternal Grandfather         brain tumor    High Blood Pressure Brother       reports that she quit smoking about 32 years ago. Her smoking use included cigarettes. She started smoking about 47 years ago. She has never used smokeless tobacco. She reports current alcohol use of about 5.0 standard drinks of alcohol per week. She reports  that she does not use drugs.    Allergies:  Allergies[1]    Medications:    Current Facility-Administered Medications:     lactated ringers infusion, , Intravenous, Continuous    Physical Exam:    Blood pressure 121/58, pulse 71, temperature 98.1 °F (36.7 °C), resp. rate 16, height 5' 2\" (1.575 m), weight 101 lb (45.8 kg), last menstrual period 07/01/2016, SpO2 98%, not currently breastfeeding.    General: Appears alert, oriented x3 and in no acute distress.  CV: Normal rate   Lungs: Normal effort   Skin: Warm and dry.  Laboratory Data:       Imaging:      Assessment/Plan/Procedure:  Patient Active Problem List   Diagnosis    Family history of colon cancer    Trigger finger (acquired)    Radial styloid tenosynovitis    Lumbago    Lumbosacral spondylosis without myelopathy    Left-sided low back pain without sciatica    Herniated nucleus pulposus, L4-5    Right-sided low back pain with right-sided sciatica    Cervicalgia    Degenerative disc disease, cervical    Abdominal pain    History of colon resection    Diverticulitis of large intestine without perforation or abscess without bleeding    Gastroesophageal reflux disease with esophagitis    Right carpal tunnel syndrome    Cubital tunnel syndrome on right    Osteopenia, unspecified location    Anxiety    RLS (restless legs syndrome)    Aortic atherosclerosis (HCC)    Articular disc disorder of temporomandibular joint    Basal cell carcinoma of skin    Diverticular disease of large intestine with complication    Diverticulitis    JACINTO (generalized anxiety disorder)    Insomnia    MDD (major depressive disorder), recurrent episode, mild (HCC)    Methicillin resistant Staphylococcus aureus infection    Movement disorder    Otitis media of right ear    Protein-calorie malnutrition (HCC)       Elevated liver enzymes   Weight loss   Recurrent epigastric abdominal pain   Change in bowel habits   Family history of colon cancer       Plan:  EGKandis Hughes,  MD  1/14/2025  8:13 AM       [1]   Allergies  Allergen Reactions    Opioid Analgesics NAUSEA AND VOMITING    Sulfamethoxazole W/Trimethoprim DIARRHEA, DIZZINESS and NAUSEA AND VOMITING    Clindamycin RASH and ITCHING    Codeine NAUSEA ONLY

## 2025-01-14 NOTE — OPERATIVE REPORT
Lamar Calero Patient Status:  Hospital Outpatient Surgery    1950 MRN [id number]   [facility] Attending Josr Hughes MD   Date 2025 PCP Cecilia Anderson MD     PREOPERATIVE DIAGNOSIS/INDICATION: Elevated LFT's. Weight loss, epigastric pain  POSTOPERTATIVE DIAGNOSIS: Nodular irregular GEJ, hiatal hernia, chronic pancreatitis changes, pancreas divisum  PROCEDURE PERFORMED: EUS/EGD with biopsy  TIME OUT WAS PERFORMED    SEDATION: MAC sedation provided by General Anesthesia    INFORMED CONSENT: Risks, benefits and alternatives to the procedure were explained to the patient including but not limited to bleeding, infection, perforation, adverse drug reactions, pancreatitis and the need for hospitalization and surgery if this occurs, the patient understands and agrees to procedure.  PROCEDURE DESCRIPTION: The upper endoscope and later the therapeutic side viewing echoendoscope were introduced into the patient’s mouth, hypo pharynx, esophagus, stomach and the first and second portion of the duodenum, straightening of the endoscope was performed to obtain a direct view of the major ampulla, retroflexion was performed in the stomach with the EGD scope only. Careful examination of the above described areas was performed on withdrawal of the endoscope. The patient tolerated the procedure well and there were no immediate complications noted during the procedure, the patient was transported to the recovery area in stable condition.  FINDINGS:  ESOPHAGUS: LA class A erosive esophagitis  GEJ: 2 cm hiatal hernia, hill grade 2-3, irregular z line with nodularity  STOMACH: Normal  DUODENUM: Normal  ECHO: Imaging was performed through the esophagus, stomach and duodenum. The celiac axis and the left adrenal gland appear wnl, the visualized portions of the left hepatic lobe appear wn;, the visualized pancreatic parenchyma showed honeycombing and lobulation, the main PD drains  through minor ampulla, the biliary tree appear wnl, the confluence of the portal vein, superior mesenteric vein and splenic vein appear wnl.  THERAPEUTICS: Cold forceps biopsies were performed from duodenum, antrum, esophagus, GEJ  RECOMMENDATIONS:   Post EUS precautions, watch for bleeding, infection, perforation, adverse drug reactions and pancreatitis.  Call status report post procedure.  Follow biopsies.  Consider Fecal elastase testing    Josr Hughes MD  1/14/2025  9:00 AM

## 2025-01-14 NOTE — ANESTHESIA PREPROCEDURE EVALUATION
PRE-OP EVALUATION    Patient Name: Lamar Calero    Admit Diagnosis: Elevated liver enzymes [R74.8]  Weight loss [R63.4]  Recurrent epigastric abdominal pain [R10.13]  Change in bowel habits [R19.4]  Family history of colon cancer [Z80.0]    Pre-op Diagnosis: Elevated liver enzymes [R74.8]  Weight loss [R63.4]  Recurrent epigastric abdominal pain [R10.13]  Change in bowel habits [R19.4]  Family history of colon cancer [Z80.0]    ESOPHAGOGASTRODUODENOSCOPY (EGD), ENDOSCOPIC ULTRASOUND (EUS), COLONOSCOPY    Anesthesia Procedure: ESOPHAGOGASTRODUODENOSCOPY (EGD), ENDOSCOPIC ULTRASOUND (EUS), COLONOSCOPY  ENDOSCOPIC ULTRASOUND (EUS)  COLONOSCOPY    Surgeons and Role:     * Josr Hughes MD - Primary    Pre-op vitals reviewed.        Body mass index is 18.47 kg/m².    Current medications reviewed.  Hospital Medications:   lactated ringers infusion   Intravenous Continuous       Outpatient Medications:   Prescriptions Prior to Admission[1]    Allergies: Opioid analgesics, Sulfamethoxazole w/trimethoprim, Clindamycin, and Codeine      Anesthesia Evaluation    Patient summary reviewed.    Anesthetic Complications  (-) history of anesthetic complications         GI/Hepatic/Renal      (-) GERD   (+) hiatal hernia                        Cardiovascular    Negative cardiovascular ROS.    Exercise tolerance: good     MET: >4                             (-) angina              Endo/Other    Negative endo/other ROS.                              Pulmonary    Negative pulmonary ROS.  (-) asthma           (-) recent URI   (-) sleep apnea       Neuro/Psych        (+) anxiety                              Past Surgical History:   Procedure Laterality Date    Back surgery      Colectomy  07/26/2017    11 inches of sigmoid colon for diverticulitis    Colon surgery      Colonoscopy  03/2012    wn     Colonoscopy      Colonoscopy N/A 10/27/2022    Procedure: COLONOSCOPY;  Surgeon: Tereso Hankins MD;  Location:  ENDOSCOPY    D & c       and Hysteroscopy    Fluor gid & loclzj ndl/cath spi dx/ther njx N/A 10/26/2015    Procedure: LUMBAR / TRANSFORAMINAL EPIDURAL STEROID INJECTION;  Surgeon: Eyad Islas DO;  Location: [facility]    Inj paravert f jnt l/s 1 lev N/A 2015    Procedure: FACET INJECTION UNDER FLUOROSCOPY;  Surgeon: Eyad Islas DO;  Location: [facility]    Inj paravert f jnt l/s 1 lev Left 2015    Procedure: SELECTIVE NERVE ROOT BLOCK;  Surgeon: Eyad Islas DO;  Location: [facility]    Injection, w/wo contrast, dx/therapeutic substance, epidural/subarachnoid; lumbar/sacral N/A 10/26/2015    Procedure: LUMBAR / TRANSFORAMINAL EPIDURAL STEROID INJECTION;  Surgeon: Eyad Islas DO;  Location: [facility]    M-sedaj by  phys perfrmg svc 5+ yr N/A 2015    Procedure: FACET INJECTION UNDER FLUOROSCOPY;  Surgeon: Eyad Islas DO;  Location: [facility]    Mri, lumbar spine  2015    Musc/tendon repair each; arm/elbow  02/10/2012    R wrist    Other Right     wrist tendon repair    Other surgical history  2005    cervical fusion x1 surgery    Removal of ovarian cyst(s)      Spine surgery procedure unlisted      Tonsillectomy       Social History     Socioeconomic History    Marital status:    Tobacco Use    Smoking status: Former     Current packs/day: 0.00     Types: Cigarettes     Start date: 7/10/1977     Quit date: 7/10/1992     Years since quittin.5    Smokeless tobacco: Never    Tobacco comments:     1 pack per week   Vaping Use    Vaping status: Never Used   Substance and Sexual Activity    Alcohol use: Yes     Alcohol/week: 5.0 standard drinks of alcohol     Types: 5 Glasses of wine per week     Comment: daily    Drug use: No    Sexual activity: Not Currently     Partners: Male     History   Drug Use No     Available pre-op labs reviewed.               Airway      Mallampati: I  Mouth opening: 3 FB  TM  distance: 4 - 6 cm  Neck ROM: full Cardiovascular    Cardiovascular exam normal.  Rhythm: regular  Rate: normal  (-) murmur   Dental             Pulmonary    Pulmonary exam normal.  Breath sounds clear to auscultation bilaterally.               Other findings              ASA: 2   Plan: MAC  NPO status verified and patient meets guidelines.    Post-procedure pain management plan discussed with surgeon and patient.    Comment: Discussed MAC anesthesia with patient.  Discussed risks including but not limited to perioperative awareness, conversion to general anesthesia and risks associated with GA.  PT understands and agrees to proceed.    Plan/risks discussed with: patient                Present on Admission:  **None**             [1]   Medications Prior to Admission   Medication Sig Dispense Refill Last Dose/Taking    pantoprazole 40 MG Oral Tab EC Take 1 tablet (40 mg total) by mouth every morning before breakfast.   Taking    ferrous sulfate 325 (65 FE) MG Oral Tab EC Take 1 tablet (325 mg total) by mouth 3 (three) times daily with meals. (Patient taking differently: Take 1 tablet (325 mg total) by mouth 3 (three) times daily with meals. IS TAKING ONCE A DAY NOW) 90 tablet 1 Taking Differently    gabapentin 300 MG Oral Cap Take 2 capsules (600 mg total) by mouth every evening.   Taking    sertraline 50 MG Oral Tab Take 1 tablet (50 mg total) by mouth daily.   Taking    hydrOXYzine 10 MG Oral Tab Take 1 tablet (10 mg total) by mouth daily with breakfast.   Taking    atorvastatin 20 MG Oral Tab Take 1 tablet (20 mg total) by mouth nightly.   Taking    rOPINIRole 1 MG Oral Tab Take 0.5 mg by mouth at bedtime.   Taking    PEG 3350-KCl-Na Bicarb-NaCl 420 g Oral Recon Soln Take as directed by your doctor 4000 mL 0     Calcium 500-125 MG-UNIT Oral Tab Take 1 tablet by mouth daily.       Cholecalciferol (VITAMIN D3) 25 MCG (1000 UT) Oral Cap Take 1 tablet by mouth daily.

## 2025-01-14 NOTE — DISCHARGE INSTRUCTIONS
Home Care Instructions for Colonoscopy and/or Gastroscopy with Sedation    Diet:  - Resume your regular diet .  - Start with light meals to minimize bloating.  - Do not drink alcohol today.    Medication:  - If you have questions about resuming your normal medications, please contact your Primary Care Physician.    Activities:  - Take it easy today. Do not return to work today.  - Do not drive today.  - Do not operate any machinery today (including kitchen equipment).    Colonoscopy:  - You may notice some rectal \"spotting\" (a little blood on the toilet tissue) for a day or two after the exam. This is normal.  - If you experience any rectal bleeding (not spotting), persistent tenderness or sharp severe abdominal pains, oral temperature over 100 degrees Fahrenheit, light-headedness or dizziness, or any other problems, contact your doctor.    Gastroscopy:  - You may have a sore throat for 2-3 days following the exam. This is normal. Gargling with warm salt water (1/2 tsp salt to 1 glass warm water) or using throat lozenges will help.  - If you experience any sharp pain in your neck, abdomen or chest, vomiting of blood, oral temperature over 100 degrees Fahrenheit, light-headedness or dizziness, or any other problems, contact your doctor.    **If unable to reach your doctor, please go to the [facility] Emergency Room**    - Your referring physician will receive a full report of your examination.  - If you do not hear from your doctor's office within two weeks of your biopsy, please call them for your results.

## 2025-01-14 NOTE — ANESTHESIA POSTPROCEDURE EVALUATION
[facility]    Lamar Calero Patient Status:  Hospital Outpatient Surgery   Age/Gender 74 year old female MRN [id number]   Location [facility] Attending Josr Hughes MD   Hosp Day # 0 PCP Cecilia Anderson MD       Anesthesia Post-op Note    ESOPHAGOGASTRODUODENOSCOPY (EGD) with biopsies, ENDOSCOPIC ULTRASOUND (EUS), COLONOSCOPY with biopsies    Procedure Summary       Date: 01/14/25 Room / Location:  ENDOSCOPY 02 / [facility]    Anesthesia Start: 0823 Anesthesia Stop: 0904    Procedures:       ESOPHAGOGASTRODUODENOSCOPY (EGD) with biopsies, ENDOSCOPIC ULTRASOUND (EUS), COLONOSCOPY with biopsies      ENDOSCOPIC ULTRASOUND (EUS)      COLONOSCOPY Diagnosis:       Elevated liver enzymes      Weight loss      Recurrent epigastric abdominal pain      Change in bowel habits      Family history of colon cancer      (EGD: GE junction nodule, hiatal hernia, esophagitis EUS: pancreatic divism COLON: surgical changes, diverticulosis)    Surgeons: Josr Hughes MD Anesthesiologist: Eyad Owens MD    Anesthesia Type: MAC ASA Status: 2            Anesthesia Type: MAC    Vitals Value Taken Time   /94 01/14/25 0905   Temp 98 01/14/25 0906   Pulse 68 01/14/25 0905   Resp 16 01/14/25 0900   SpO2 96 % 01/14/25 0905   Vitals shown include unfiled device data.        Patient Location: Endoscopy    Anesthesia Type: MAC    Airway Patency: patent and extubated    Postop Pain Control: adequate    Mental Status: mildly sedated but able to meaningfully participate in the post-anesthesia evaluation    Nausea/Vomiting: none    Cardiopulmonary/Hydration status: stable euvolemic    Complications: no apparent anesthesia related complications    Postop vital signs: stable    Dental Exam: Unchanged from Preop    Patient to be discharged from PACU when criteria met.

## 2025-01-14 NOTE — OPERATIVE REPORT
Lamar Calero Patient Status:  Hospital Outpatient Surgery    1950 MRN [id number]   [facility] Attending Josr Hughes MD   Date 2025 PCP Cecilia Anderson MD     PREOPERATIVE DIAGNOSIS/INDICATION: Change in bowel habits, family history of colon cancer   POSTOPERTATIVE DIAGNOSIS: Post surgical changes, diverticulosis, hemorrhoids  PROCEDURE PERFORMED: COLONOSCOPY with biopsy  SEDATION: MAC sedation provided by General Anesthesia    TIME OUT WAS PERFORMED    INFORMED CONSENT: Risks, benefits and alternatives to the procedure were explained to the patient including but not limited to bleeding, infection, perforation, adverse drug reactions, pancreatitis and the need for hospitalization and surgery if this occurs, the patient understands and agrees to procedure.  PROCEDURE DESCRIPTION: After careful digital rectal examination a pediatric colonoscope was introduced into the patients rectum, advanced pass the recto sigmoid junction, into the descending colon, splenic flexure, transverse colon, hepatic flexure, ascending colon, cecum , confirmed by landmarks, including the appendiceal orifice and ileocecal valve. Careful examination of the above described areas was performed on withdrawal of the endoscope. Retroflexion was performed on the rectum. The patient tolerated the procedure well, there were no immediate complication immediately following the procedure, and the patient was transferred to recovery in stable condition.  QUALITY OF PREPARATION: Henefer Bowel Preparation Scale:            -      Right colon 3, Transverse colon 3, Left colon 3   FINDINGS/THERAPEUTICS:  COLON: Random colon mucosal biopsies were performed with cold forceps, moderate left sided diverticulosis, post surgical changes c/w left colon resection and colorectal anastomosis  RECTUM: Grade 1-2 Internal hemorrhoids  RECOMMENDATIONS:   Post Colonoscopy precautions, watch for bleeding,  infection, perforation, adverse drug reactions   Follow biopsies.  Repeat colonoscopy in 5 years if clinically indicated at that time.    Josr Hughes MD  1/14/2025  8:57 AM

## 2025-01-16 NOTE — PROGRESS NOTES
Date: 1/15/2025    To: Lamar Doty Abdias  : 1950    I hope this letter finds you doing well.  I am writing to inform you of the following:     The biopsies obtained at the time of your recent endoscopic procedures were benign and showed no evidence of infection or malignancy.         I am advising you to undergo repeat colonoscopy in 5 years. We will send you a reminder card when the time of your procedure is near.      Please call the office at [phone] if there are any questions.    Regards,    Josr Hughes M.D.

## 2025-01-23 PROBLEM — F41.9 ANXIETY: Status: ACTIVE | Noted: 2019-03-02

## 2025-01-23 PROBLEM — M85.80 OSTEOPENIA: Status: ACTIVE | Noted: 2019-03-01

## 2025-01-23 PROBLEM — G25.81 RESTLESS LEGS: Status: ACTIVE | Noted: 2019-03-01

## 2025-01-29 ENCOUNTER — LAB ENCOUNTER (OUTPATIENT)
Dept: LAB | Age: 75
End: 2025-01-29
Payer: MEDICARE

## 2025-01-29 DIAGNOSIS — R10.13 RECURRENT EPIGASTRIC ABDOMINAL PAIN: ICD-10-CM

## 2025-01-29 DIAGNOSIS — R74.8 ELEVATED LIVER ENZYMES: ICD-10-CM

## 2025-01-29 DIAGNOSIS — D50.8 OTHER IRON DEFICIENCY ANEMIA: ICD-10-CM

## 2025-01-29 DIAGNOSIS — R19.4 CHANGE IN BOWEL HABITS: ICD-10-CM

## 2025-01-29 DIAGNOSIS — R63.4 WEIGHT LOSS: ICD-10-CM

## 2025-01-29 LAB
ALBUMIN SERPL-MCNC: 4.5 G/DL (ref 3.2–4.8)
ALBUMIN/GLOB SERPL: 1.8 {RATIO} (ref 1–2)
ALP LIVER SERPL-CCNC: 78 U/L
ALT SERPL-CCNC: 21 U/L
ANION GAP SERPL CALC-SCNC: 12 MMOL/L (ref 0–18)
AST SERPL-CCNC: 27 U/L (ref ?–34)
BILIRUB SERPL-MCNC: 0.4 MG/DL (ref 0.2–1.1)
BUN BLD-MCNC: 17 MG/DL (ref 9–23)
CALCIUM BLD-MCNC: 9.6 MG/DL (ref 8.7–10.6)
CHLORIDE SERPL-SCNC: 105 MMOL/L (ref 98–112)
CO2 SERPL-SCNC: 26 MMOL/L (ref 21–32)
CREAT BLD-MCNC: 0.69 MG/DL
DEPRECATED HBV CORE AB SER IA-ACNC: 43 NG/ML
EGFRCR SERPLBLD CKD-EPI 2021: 92 ML/MIN/1.73M2 (ref 60–?)
FASTING STATUS PATIENT QL REPORTED: YES
GLOBULIN PLAS-MCNC: 2.5 G/DL (ref 2–3.5)
GLUCOSE BLD-MCNC: 93 MG/DL (ref 70–99)
IGA SERPL-MCNC: 372.1 MG/DL (ref 70–312)
IRON SATN MFR SERPL: 18 %
IRON SERPL-MCNC: 62 UG/DL
OSMOLALITY SERPL CALC.SUM OF ELEC: 297 MOSM/KG (ref 275–295)
POTASSIUM SERPL-SCNC: 5 MMOL/L (ref 3.5–5.1)
PROT SERPL-MCNC: 7 G/DL (ref 5.7–8.2)
SODIUM SERPL-SCNC: 143 MMOL/L (ref 136–145)
TOTAL IRON BINDING CAPACITY: 339 UG/DL (ref 250–425)
TRANSFERRIN SERPL-MCNC: 267 MG/DL (ref 250–380)

## 2025-01-29 PROCEDURE — 83550 IRON BINDING TEST: CPT

## 2025-01-29 PROCEDURE — 82784 ASSAY IGA/IGD/IGG/IGM EACH: CPT

## 2025-01-29 PROCEDURE — 80053 COMPREHEN METABOLIC PANEL: CPT

## 2025-01-29 PROCEDURE — 82728 ASSAY OF FERRITIN: CPT

## 2025-01-29 PROCEDURE — 36415 COLL VENOUS BLD VENIPUNCTURE: CPT

## 2025-01-29 PROCEDURE — 86364 TISS TRNSGLTMNASE EA IG CLAS: CPT

## 2025-01-29 PROCEDURE — 83540 ASSAY OF IRON: CPT

## 2025-01-30 LAB — TTG IGA SER-ACNC: 1.5 U/ML (ref ?–7)

## 2025-02-01 ENCOUNTER — LAB ENCOUNTER (OUTPATIENT)
Dept: LAB | Age: 75
End: 2025-02-01
Attending: INTERNAL MEDICINE
Payer: MEDICARE

## 2025-02-01 DIAGNOSIS — R19.4 CHANGE IN BOWEL HABITS: ICD-10-CM

## 2025-02-01 DIAGNOSIS — R10.13 RECURRENT EPIGASTRIC ABDOMINAL PAIN: ICD-10-CM

## 2025-02-01 DIAGNOSIS — R63.4 WEIGHT LOSS: ICD-10-CM

## 2025-02-01 DIAGNOSIS — R19.5 LOOSE STOOLS: ICD-10-CM

## 2025-02-01 PROCEDURE — 83993 ASSAY FOR CALPROTECTIN FECAL: CPT

## 2025-02-01 PROCEDURE — 82656 EL-1 FECAL QUAL/SEMIQ: CPT

## 2025-02-01 PROCEDURE — 82705 FATS/LIPIDS FECES QUAL: CPT

## 2025-02-01 PROCEDURE — 87493 C DIFF AMPLIFIED PROBE: CPT

## 2025-02-01 PROCEDURE — 87329 GIARDIA AG IA: CPT

## 2025-02-01 PROCEDURE — 87272 CRYPTOSPORIDIUM AG IF: CPT

## 2025-02-02 LAB
C DIFF TOX B STL QL: NEGATIVE
CRYPTOSP AG STL QL IA: NEGATIVE
G LAMBLIA AG STL QL IA: NEGATIVE

## 2025-02-03 LAB — CALPROTECTIN STL-MCNT: 69.4 ΜG/G (ref ?–50)

## 2025-02-04 LAB
FATS NEUTRAL: NORMAL
FATS TOTAL: NORMAL

## 2025-02-05 LAB — PANCREATIC ELAST FECAL: >800 UG ELAST./G

## 2025-02-15 ENCOUNTER — HOSPITAL ENCOUNTER (OUTPATIENT)
Age: 75
Discharge: HOME | End: 2025-02-15
Attending: EMERGENCY MEDICINE
Payer: MEDICARE

## 2025-02-15 VITALS
BODY MASS INDEX: 20.24 KG/M2 | RESPIRATION RATE: 18 BRPM | WEIGHT: 110 LBS | SYSTOLIC BLOOD PRESSURE: 122 MMHG | HEIGHT: 62 IN | TEMPERATURE: 98 F | OXYGEN SATURATION: 98 % | DIASTOLIC BLOOD PRESSURE: 54 MMHG | HEART RATE: 66 BPM

## 2025-02-15 DIAGNOSIS — K14.6 TONGUE PAIN: Primary | ICD-10-CM

## 2025-02-15 PROCEDURE — 99213 OFFICE O/P EST LOW 20 MIN: CPT

## 2025-02-15 PROCEDURE — 99203 OFFICE O/P NEW LOW 30 MIN: CPT

## 2025-02-15 RX ORDER — NYSTATIN 100000 [USP'U]/ML
5 SUSPENSION ORAL 4 TIMES DAILY
Qty: 140 ML | Refills: 0 | Status: SHIPPED | OUTPATIENT
Start: 2025-02-15 | End: 2025-02-22

## 2025-02-15 NOTE — ED INITIAL ASSESSMENT (HPI)
Patient reports she first noticed that her tongue felt like she had burnt it on Monday.  Since then it has felt tingly, numb, and painful.  Patient reports she can't taste.  Patient reports she thought she may have thrush because she has had that before.

## 2025-02-15 NOTE — DISCHARGE INSTRUCTIONS
Nystatin as prescribed  Over the counter ibuprofen 400mg every six hours   Tongue brushing  Follow up with primary care if not improved

## 2025-02-15 NOTE — ED PROVIDER NOTES
Patient Seen in: [facility]      History     Chief Complaint   Patient presents with    Other     Tongue problem     Stated Complaint: feels tingly sensation on tounge and mouth    Subjective:   HPI      75 yo female with tingling pain to her tongue. Tongue feels swollen. Feels like she burned it but did not. Had an episode of thrush in the past with similar symptoms.     Objective:     Past Medical History:    Abdominal hernia    Abdominal pain    Anemia    Anxiety    Arthritis    Back pain    Back problem    c4-6 fusion, LESI; lumbar    Black stools    since I started the Iron pills    Bloating    Cancer (HCC)    basal cell carcinoma    Chest pain    Colitis    Constipation    Diarrhea, unspecified    Diverticulosis of large intestine    Dizziness    Flatulence/gas pain/belching    Hemorrhoids    Hiatal hernia    High cholesterol    History of basal cell cancer    nose    History of basal cell carcinoma    chest    Indigestion    Irregular bowel habits    Night sweats    Osteoporosis    Pneumonia, organism unspecified(486)    Presence of other cardiac implants and grafts    Fusion in neck    Restless leg syndrome    Rosacea    Sleep disturbance    Stool incontinence    Stress    Thrush    Uncomfortable fullness after meals    Visual impairment    glasses    Wears glasses    Weight loss              Past Surgical History:   Procedure Laterality Date    Back surgery      Colectomy  07/26/2017    11 inches of sigmoid colon for diverticulitis    Colon surgery      Colonoscopy  03/2012    wnl     Colonoscopy      Colonoscopy N/A 10/27/2022    Procedure: COLONOSCOPY;  Surgeon: Tereso Hankins MD;  Location:  ENDOSCOPY    Colonoscopy N/A 1/14/2025    Procedure: COLONOSCOPY;  Surgeon: Josr Hughes MD;  Location:  ENDOSCOPY    D & c  2012    and Hysteroscopy    Fluor gid & loclzj ndl/cath spi dx/ther njx N/A 10/26/2015    Procedure: LUMBAR / TRANSFORAMINAL EPIDURAL STEROID INJECTION;  Surgeon:  Eyad Islas DO;  Location: [facility]    Inj paravert f jnt l/s 1 lev N/A 2015    Procedure: FACET INJECTION UNDER FLUOROSCOPY;  Surgeon: Eyad Islas DO;  Location: [facility]    Inj paravert f jnt l/s 1 lev Left 2015    Procedure: SELECTIVE NERVE ROOT BLOCK;  Surgeon: Eyad Islas DO;  Location: [facility]    Injection, w/wo contrast, dx/therapeutic substance, epidural/subarachnoid; lumbar/sacral N/A 10/26/2015    Procedure: LUMBAR / TRANSFORAMINAL EPIDURAL STEROID INJECTION;  Surgeon: Eyad Islas DO;  Location: [facility]    M-sedaj by  phys perfrmg svc 5+ yr N/A 2015    Procedure: FACET INJECTION UNDER FLUOROSCOPY;  Surgeon: Eyad Islas DO;  Location: [facility]    Mri, lumbar spine  2015    Musc/tendon repair each; arm/elbow  02/10/2012    R wrist    Other Right     wrist tendon repair    Other surgical history  2005    cervical fusion x1 surgery    Removal of ovarian cyst(s)      Spine surgery procedure unlisted      Tonsillectomy                  Social History     Socioeconomic History    Marital status:    Tobacco Use    Smoking status: Former     Current packs/day: 0.00     Average packs/day: 1 pack/day for 15.0 years (15.0 ttl pk-yrs)     Types: Cigarettes     Start date: 7/10/1977     Quit date: 7/10/1992     Years since quittin.6    Smokeless tobacco: Never    Tobacco comments:     1 pack per week   Vaping Use    Vaping status: Never Used   Substance and Sexual Activity    Alcohol use: Yes     Alcohol/week: 3.0 standard drinks of alcohol     Types: 3 Glasses of wine per week     Comment: daily    Drug use: Yes     Types: Cannabis     Comment: smokes occasionally    Sexual activity: Not Currently     Partners: Male     Social Drivers of Health      Received from [employer]    [facility]              Review of Systems    Positive for stated complaint: feels tingly  sensation on tounge and mouth  Other systems are as noted in HPI.  Constitutional and vital signs reviewed.      All other systems reviewed and negative except as noted above.    Physical Exam     ED Triage Vitals [02/15/25 1453]   /54   Pulse 66   Resp 18   Temp 98.4 °F (36.9 °C)   Temp src Oral   SpO2 98 %   O2 Device None (Room air)       Current Vitals:   Vital Signs  BP: 122/54  Pulse: 66  Resp: 18  Temp: 98.4 °F (36.9 °C)  Temp src: Oral    Oxygen Therapy  SpO2: 98 %  O2 Device: None (Room air)        Physical Exam  Vitals and nursing note reviewed.   Constitutional:       Appearance: Normal appearance. She is well-developed.   HENT:      Head: Normocephalic and atraumatic.      Mouth/Throat:      Mouth: Mucous membranes are moist.      Comments: Tongue does appear mildly swollen and beefy around the edges. No airway compromise. Normal posterior pharynx. No white patches. No ulcerations.   Cardiovascular:      Rate and Rhythm: Normal rate and regular rhythm.   Pulmonary:      Effort: Pulmonary effort is normal. No respiratory distress.   Musculoskeletal:      Cervical back: Neck supple.   Lymphadenopathy:      Cervical: No cervical adenopathy.   Skin:     General: Skin is warm and dry.      Capillary Refill: Capillary refill takes less than 2 seconds.   Neurological:      General: No focal deficit present.      Mental Status: She is alert.   Psychiatric:         Mood and Affect: Mood normal.         Behavior: Behavior normal.            ED Course   Labs Reviewed - No data to display                MDM           Medical Decision Making  Thrush, fissured tongue, geographic tongue, bacterial infection all in differential. Unclear etiology. Will discharge on trial of nystatin. Ibuprofen for pain and frequent tongue brushing.     Disposition and Plan     Clinical Impression:  1. Tongue pain         Disposition:  Discharge  2/15/2025  3:38 pm    Follow-up:  Cecilia Anderson MD  [address]  [address]  [facility] [address]  [phone]      As needed          Medications Prescribed:  Discharge Medication List as of 2/15/2025  3:42 PM        START taking these medications    Details   nystatin 216368 UNIT/ML Mouth/Throat Suspension Take 5 mL (500,000 Units total) by mouth 4 (four) times daily for 7 days. Retain in mouth prior to swallowing, Normal, Disp-140 mL, R-0                 Supplementary Documentation:

## 2025-04-24 ENCOUNTER — RX ONLY (RX ONLY)
Age: 75
End: 2025-04-24

## 2025-04-24 RX ORDER — AZELAIC ACID 0.15 G/G
GEL TOPICAL
Qty: 50 | Refills: 2 | Status: ERX | COMMUNITY
Start: 2025-04-24

## (undated) DEVICE — BALLOON HEMOSTATIC EUS LINEAR

## (undated) DEVICE — SUTURE PDS II 1 CTX

## (undated) DEVICE — CADIERE FORCEPS: Brand: ENDOWRIST;DAVINCI SI

## (undated) DEVICE — TUBING CYSTO

## (undated) DEVICE — KIT VLV 5 PC AIR H2O SUCT BX ENDOGATOR CONN

## (undated) DEVICE — BULB SYRINGE,IRRIGATION WITH PROTECTIVE CAP: Brand: DOVER

## (undated) DEVICE — SUTURE PDS II 0 CT-1

## (undated) DEVICE — ENDOSCOPY PACK - LOWER: Brand: MEDLINE INDUSTRIES, INC.

## (undated) DEVICE — 10FT COMBINED O2 DELIVERY/CO2 MONITORING. FILTER WITH MICROSTREAM TYPE LUER: Brand: DUAL ADULT NASAL CANNULA

## (undated) DEVICE — GOWN,SIRUS,FABRIC-REINFORCED,X-LARGE: Brand: MEDLINE

## (undated) DEVICE — PERMANENT CAUTERY HOOK: Brand: ENDOWRIST;DAVINCI SI

## (undated) DEVICE — DV SEAL CANNULA 8.5-13MM

## (undated) DEVICE — LAP COLON CDS: Brand: MEDLINE INDUSTRIES, INC.

## (undated) DEVICE — SUTURE PDS II 1 TP-1

## (undated) DEVICE — PROXIMATE LINEAR STAPLER RELOADS: Brand: PROXIMATE

## (undated) DEVICE — SOL  .9 3000ML

## (undated) DEVICE — SYRINGE 30ML LL TIP

## (undated) DEVICE — VIOLET BRAIDED (POLYGLACTIN 910), SYNTHETIC ABSORBABLE SUTURE: Brand: COATED VICRYL

## (undated) DEVICE — Device: Brand: DEFENDO AIR/WATER/SUCTION AND BIOPSY VALVE

## (undated) DEVICE — 3M™ RED DOT™ MONITORING ELECTRODE WITH FOAM TAPE AND STICKY GEL, 50/BAG, 20/CASE, 72/PLT 2570: Brand: RED DOT™

## (undated) DEVICE — DRAIN RELIAVAC 100CC

## (undated) DEVICE — Device

## (undated) DEVICE — STERILE POLYISOPRENE POWDER-FREE SURGICAL GLOVES: Brand: PROTEXIS

## (undated) DEVICE — BETADINE SOLUTION 8 OZ BTL

## (undated) DEVICE — DV OBTURATOR BLADELESS 8MM

## (undated) DEVICE — MONOFILAMENT ABSORBABLE SUTURE: Brand: MAXON

## (undated) DEVICE — ENDO POUCH

## (undated) DEVICE — SOL H2O 1000ML BTL

## (undated) DEVICE — DRESSING OPTIFOAM AG 3.5X10

## (undated) DEVICE — GAUZE SPONGES,USP TYPE VII GAUZE, 12 PLY: Brand: CURITY

## (undated) DEVICE — 40580 - THE PINK PAD - ADVANCED TRENDELENBURG POSITIONING KIT: Brand: 40580 - THE PINK PAD - ADVANCED TRENDELENBURG POSITIONING KIT

## (undated) DEVICE — DRAIN CHANNEL 19FR BLAKE

## (undated) DEVICE — DRESSING OPTIFOAM AG 3.5X6

## (undated) DEVICE — STAPLER SHEATH: Brand: ENDOWRIST

## (undated) DEVICE — V2 SPECIMEN COLLECTION MANIFOLD KIT: Brand: NEPTUNE

## (undated) DEVICE — SUTURE VICRYL 0

## (undated) DEVICE — 1200CC GUARDIAN II: Brand: GUARDIAN

## (undated) DEVICE — FILTERLINE NASAL ADULT O2/CO2

## (undated) DEVICE — SUTURE ETHILON 2-0 FS

## (undated) DEVICE — STAPLER 45 RELOAD: Brand: ENDOWRIST

## (undated) DEVICE — GIJAW SINGLE-USE BIOPSY FORCEPS WITH NEEDLE: Brand: GIJAW

## (undated) DEVICE — ENDOPATH XCEL DILATING TIP TROCARS WITH STABILITY SLEEVES: Brand: ENDOPATH XCEL

## (undated) DEVICE — KENDALL SCD EXPRESS SLEEVES, KNEE LENGTH, MEDIUM: Brand: KENDALL SCD

## (undated) DEVICE — ECHELON FLEX POWERED PLUS ARTICULATING ENDOSCOPIC LINEAR CUTTER , 60MM: Brand: ECHELON FLEX

## (undated) DEVICE — KIT CUSTOM ENDOPROCEDURE STERIS

## (undated) DEVICE — SUTURE VICRYL 5-0 PC-1

## (undated) DEVICE — DRAPE WARMER ORS-300

## (undated) DEVICE — ENDOPATH ECHELON ENDOSCOPIC LINEAR CUTTER RELOADS, BLUE, 60MM: Brand: ECHELON ENDOPATH

## (undated) DEVICE — BITEBLOCK ENDOSCP 60FR MAXI STRP

## (undated) DEVICE — BAG DRAIN INFECTION CNTRL 2000

## (undated) DEVICE — SUTURE PROLENE 2-0 SH

## (undated) DEVICE — TISSUE FUSION LAPAROSCOPIC INSTRUMENT: Brand: LIGASURE ATLAS

## (undated) DEVICE — ELECTRO LUBE IS A SINGLE PATIENT USE DEVICE THAT IS INTENDED TO BE USED ON ELECTROSURGICAL ELECTRODES TO REDUCE STICKING.: Brand: KEY SURGICAL ELECTRO LUBE

## (undated) DEVICE — CIRCULAR MECH XL SEAL 25MM

## (undated) DEVICE — MEDI-VAC TUBING CONNECTOR 6-IN-1 "Y" POLYPROPYLENE: Brand: CARDINAL HEALTH

## (undated) DEVICE — DV KIT ACCESSORY 4-ARM

## (undated) DEVICE — COVER,MAYO STAND,STERILE: Brand: MEDLINE

## (undated) DEVICE — STAPLER CANNULA SEAL: Brand: ENDOWRIST

## (undated) NOTE — LETTER
17    Patient: Armida Hopkins  : 1950 Visit date: 2017    Dear  Dr. Katelyn Mercado,    Thank you for referring Armida Hopkins to my practice. Please find my assessment and plan below.          Assessment   Acute diverticulitis  (primary encounter alvino daughter. I discourage the patient from using any type of internal rectal medication such as Proctofoam, as this may disturb the anastomosis. The patient should not lift greater than 10 pounds for 6 weeks after surgery.   She may return to light cardiac

## (undated) NOTE — LETTER
10/06/22    Patient: Damian Mccann  : 1950 Visit date: 10/6/2022    Dear  Rudolph Read    Thank you for referring Damian Mccann to my practice. Please find my assessment and plan below. Assessment   Family history of colon cancer  (primary encounter diagnosis)      Plan   The patient presents today in consultation for a colonoscopy. The patient has  had a prior colonoscopy. I performed this patient's last colonoscopy in 2017. She has never had a colon polyp. The patient denies diarrhea, constipation or alternating between the two. The patient denies having any blood, mucus or dark tarry stools. The patient denies having any narrowing of stools. The patient denies any unintentional weight loss. The patient denies any abdominal pain. She does have intermittent abdominal distention the patient denies fevers, chills, nausea or vomiting. The patient's mother had colon cancer at the age of 80 and her maternal grandmother also has a history of colon cancer. The patient denies any first or second-degree relatives with a history of colon polyps. The patient denies any first or second-degree family history of uterine cancer. The patient has a past medical history significant for diverticulitis. She does not take any blood thinners. The patient has a low anterior resection of the rectosigmoid colon for diverticulitis with me on 2017. Clinical examination of the abdomen reveals it to be soft, nondistended, nontender, bowel sounds are normal activity normal pitch. There  is no rebounding tenderness or guarding. There are no signs of ascites or peritonitis. The liver and spleen are nonpalpable. There are no palpable masses or hernias. The patient has well-healed laparoscopic and Pfannenstiel incisions. The patient will be scheduled to undergo a colonoscopy on 10/27/2022 at [facility] in [facility].     All risks, benefits, complications and alternatives to the proposed procedure(s) were fully discussed with the patient. All questions from the patient were answered in detail. A description of the procedure(s) possible outcomes was fully discussed. The patient seemed to understand the conversation and its details. Consent for the procedure(s) was confirmed with the patient.       Sincerely,       Sean Michelle MD   CC: No Recipients

## (undated) NOTE — Clinical Note
2017    Patient: Julia Edouard  : 1950 Visit date: 2017    Dear  Dr. Robert Hale,    Thank you for referring Julia Edouard to my practice. Please find my assessment and plan below.       Assessment   Acute diverticulitis  (primary encounter alvino cervical and spinal fusion, D&C, and tendon repair of the right hand. The patient had a colonoscopy in 2012 with Dr. Sarita Martinez. This demonstrated diverticulosis and internal hemorrhoids. There were no polyps.   She does have a family history of colon cancer

## (undated) NOTE — LETTER
18    Patient: Isaias Gu  : 1950 Visit date: 2018    Dear  Dr. Kendell Covington,    Thank you for referring Isaias Gu to my practice. Please find my assessment and plan below.                Assessment   Lower abdominal pain  (primary encount therapy. If she gets recurrence of symptoms, it most likely will happen within 48 hours. She should be with a low threshold to call our office to get back on antibiotic therapy for any evidence of recurrence of symptoms.     Long-term, we will treat this

## (undated) NOTE — LETTER
17    Patient: Ramiro Trujillo  : 1950 Visit date: 2017    Dear  Dr. Lakshmi Angel,    Thank you for referring Ramiro Trujillo to my practice. Please find my assessment and plan below.                Assessment   Diverticulitis  (primary encounter d My impression is that this patient continues to do excessive lifting. She admits to lifting up to 50 pound grandchildren up to 11years of age. She states that she will pick them up and carry them for several minutes, even up to 30 minutes at a time.   She

## (undated) NOTE — MR AVS SNAPSHOT
[facility]  [address] .  [address] [address] [phone]               Thank you for choosing us for your health care visit with Lashonda Bowman MD.  We are glad to serve you and happy to provide you with this summary of you was noted to have diverticulosis. There were no other findings. There are specifically no polyps or colitis. At today's office visit, the patient has no current pain or symptoms. She is tolerating a diet.     The patient states that since she was give There is an option to wait and see if she has any further attacks. The patient states that she will pay significant attention to the symptoms that she has now had at least on 2 major occasions.   She will call me at the onset of any pain or symptoms that * levofloxacin 500 MG Tabs   Take 1 tablet (500 mg total) by mouth daily. Commonly known as:  LEVAQUIN           * metRONIDAZOLE 250 MG Tabs   Take 1 tablet (250 mg total) by mouth 3 (three) times daily.    Commonly known as:  FLAGYL           * metRONID

## (undated) NOTE — Clinical Note
2017    Patient: Erendira Gallardo  : 1950 Visit date: 2017    Dear  Dr. Marcela Casey,    Thank you for referring Erendira Gallardo to my practice. Please find my assessment and plan below.         Assessment   Acute diverticulitis  (primary encounter d has not added any other dietary fiber to her diet. She is relying solely on the generic Benefiber. Clinical exam today reveals her abdomen to be soft, nondistended, nontender, good bowel sounds. She has no guarding or rebound.   There are no incisions operating includes presenting at initial onset of symptoms with a perforation or abscess. This could potentially lead to a temporary colostomy in 1 or 2 operations to complete her care. They will think about their decision regarding surgery.   I told hi

## (undated) NOTE — MR AVS SNAPSHOT
[facility] [address]  [address] [id number] [phone]               Thank you for choosing us for your health care visit with Ac Diaz MD.  We are glad to serve you and happy to provide you wi fevers or chills. She denies nausea or vomiting. She denies blood in the stool, bright red blood per rectum, or black or tarry stools. The patient is due for a colonoscopy this year.     The patient's past medical history is significant for restless leg emergency situation. If the patient does have another attack of diverticulitis our goal will be to manage it with antibiotics and discuss surgery in the elective setting. All questions concerns were answered and addressed at today's office visit.   The pa If you have questions, you can call [phone] to talk to our [facility] Staff. Remember, [employer] is NOT to be used for urgent needs. For medical emergencies, dial 911. Visit [email or web address] to learn more.            Visit [facility]

## (undated) NOTE — LETTER
17    Patient: Diane Farah  : 1950 Visit date: 2017    Dear  Dr. Shira Johnson,    Thank you for referring Christophergilbert Farah to my practice. Please find my assessment and plan below.            Assessment   Diverticulitis of intestine without perfor day.  This has given her softer and more regular bowel movements. She states that before this, she did tend toward some constipation. She has not added any other dietary fiber to her diet. She is relying solely on the generic Benefiber.      NATASHA recio

## (undated) NOTE — LETTER
18    Patient: Cristopher Ramon  : 1950 Visit date: 2018    Dear  Dr. Kathy Gu,    Thank you for referring Cristopher Ramon to my practice. Please find my assessment and plan below.                Assessment   Diverticulitis of large intestine wit The patient has noted some heart palpitations recently. She states she has had these in the past, but it has been more noticeable to her in the last couple of weeks regarding she is wondering whether her antibiotics could be responsible.     Physical exami over a week. Once the stool occupying the colon has cleared, bowel movements will return to a more regular schedule, i.e. once a day.     If the patient has not had a bowel movement by tomorrow morning, I have asked her to take 2 tablespoons of milk of mag

## (undated) NOTE — LETTER
17    Patient: Geri Hunter  : 1950 Visit date: 2017    Dear  Dr. Mehreen Bronson,    Thank you for referring Geri Hunter to my practice. Please find my assessment and plan below.                Assessment   Acute diverticulitis  (primary encoun

## (undated) NOTE — IP AVS SNAPSHOT
[facility] [place] [address] [place], [address] ~ [phone]                Discharge Summary   1/6/2017    Tate Lama           Admission Information        Provider Department    1/6/2017 Kyree Parson MD  3sw-A         Than VAGIFEM 10 MCG Tabs   Generic drug:  Estradiol   Next dose due:  1/9/17 AM        Place 10 mcg vaginally twice a week. Monday and Thursday. Monday and Thursday only.                    Vitamin D3 1000 UNITS Caps   Next dose due:  1/9/17 AM        T Specialty:  SURGERY, GENERAL    Contact information:    [address] [facility].  [phone]        Immunization History as of 1/9/2017  Reviewed on 1/6/2017    No immunizations on file.       Recent Hematology Lab Results  (Last 3 re ALT Bilirubin,Total Total Protein Albumin Sodium Potassium Chloride    (01/07/17)  10 (L) (01/07/17)  0.8 (01/07/17)  6.1 (01/07/17)  2.6 (L) (01/08/17)  138 (01/08/17)  4.1 (01/08/17)  109      Radiology Exams     None         Additional Information your medications while at home. Ant-Infective Medications     levofloxacin (LEVAQUIN) 500 MG Oral Tab    metRONIDAZOLE (FLAGYL) 250 MG Oral Tab         Use: Treat infections or suspected infection   Most common side effects:  Allergic reactions, annamarie